# Patient Record
Sex: MALE | Race: BLACK OR AFRICAN AMERICAN | NOT HISPANIC OR LATINO
[De-identification: names, ages, dates, MRNs, and addresses within clinical notes are randomized per-mention and may not be internally consistent; named-entity substitution may affect disease eponyms.]

---

## 2018-08-06 ENCOUNTER — APPOINTMENT (OUTPATIENT)
Dept: FAMILY MEDICINE | Facility: CLINIC | Age: 38
End: 2018-08-06

## 2018-08-08 ENCOUNTER — APPOINTMENT (OUTPATIENT)
Dept: FAMILY MEDICINE | Facility: CLINIC | Age: 38
End: 2018-08-08
Payer: COMMERCIAL

## 2018-08-08 VITALS
DIASTOLIC BLOOD PRESSURE: 76 MMHG | WEIGHT: 140 LBS | HEIGHT: 69 IN | BODY MASS INDEX: 20.73 KG/M2 | HEART RATE: 93 BPM | SYSTOLIC BLOOD PRESSURE: 113 MMHG

## 2018-08-08 DIAGNOSIS — Z81.1 FAMILY HISTORY OF ALCOHOL ABUSE AND DEPENDENCE: ICD-10-CM

## 2018-08-08 DIAGNOSIS — Z11.3 ENCOUNTER FOR SCREENING FOR INFECTIONS WITH A PREDOMINANTLY SEXUAL MODE OF TRANSMISSION: ICD-10-CM

## 2018-08-08 DIAGNOSIS — Z82.49 FAMILY HISTORY OF ISCHEMIC HEART DISEASE AND OTHER DISEASES OF THE CIRCULATORY SYSTEM: ICD-10-CM

## 2018-08-08 PROCEDURE — 99203 OFFICE O/P NEW LOW 30 MIN: CPT

## 2018-08-14 PROBLEM — Z81.1 FAMILY HISTORY OF ALCOHOLISM: Status: ACTIVE | Noted: 2018-08-14

## 2018-08-14 PROBLEM — Z82.49 FAMILY HISTORY OF HYPERTENSION: Status: ACTIVE | Noted: 2018-08-14

## 2018-08-14 NOTE — PHYSICAL EXAM
[No Acute Distress] : no acute distress [Well Nourished] : well nourished [Well Developed] : well developed [Well-Appearing] : well-appearing [Normal Voice/Communication] : normal voice/communication [No JVD] : no jugular venous distention [Supple] : supple [No Lymphadenopathy] : no lymphadenopathy [No Respiratory Distress] : no respiratory distress  [Clear to Auscultation] : lungs were clear to auscultation bilaterally [No Accessory Muscle Use] : no accessory muscle use [Normal Gait] : normal gait [Speech Grossly Normal] : speech grossly normal [Memory Grossly Normal] : memory grossly normal [Normal Affect] : the affect was normal [Alert and Oriented x3] : oriented to person, place, and time [Normal Insight/Judgement] : insight and judgment were intact

## 2018-08-14 NOTE — HISTORY OF PRESENT ILLNESS
[FreeTextEntry8] : New patient presents c/o thinks he has ADHD like his 2 brothers (ages 20 and 21) who were diagnosed in the past year. They take Adderall. 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 Has trouble at one job because he is assigned to two different areas, and difficult managing both. Drinks Coffee 6 times per day, helps him concentrate. Sees Dr. Wyman, psychologist, who recommended he start a medication for ADHD.

## 2018-08-14 NOTE — ASSESSMENT
[FreeTextEntry1] : ADHD survey completed by patient, to be scanned\par start Adderall, call or come in if any adverse effects noticed\par RTO 1 month for f/u appointment re: ADHD

## 2018-08-29 ENCOUNTER — APPOINTMENT (OUTPATIENT)
Dept: FAMILY MEDICINE | Facility: CLINIC | Age: 38
End: 2018-08-29

## 2018-08-31 ENCOUNTER — APPOINTMENT (OUTPATIENT)
Dept: FAMILY MEDICINE | Facility: CLINIC | Age: 38
End: 2018-08-31
Payer: COMMERCIAL

## 2018-08-31 VITALS
HEIGHT: 69 IN | BODY MASS INDEX: 18.99 KG/M2 | DIASTOLIC BLOOD PRESSURE: 70 MMHG | HEART RATE: 85 BPM | SYSTOLIC BLOOD PRESSURE: 110 MMHG | OXYGEN SATURATION: 100 % | WEIGHT: 128.25 LBS | RESPIRATION RATE: 16 BRPM

## 2018-08-31 PROCEDURE — 99213 OFFICE O/P EST LOW 20 MIN: CPT

## 2018-08-31 NOTE — ASSESSMENT
[FreeTextEntry1] : Patient seems to be doing quite well we will refill his medication he is advised to contact Dr. Otoole in some time it in the next 2 weeks by telephone to determine what followup schedule she would like to maintain

## 2018-08-31 NOTE — HISTORY OF PRESENT ILLNESS
[FreeTextEntry1] : ADHD [de-identified] : Patient is here for a followup visit on his ADHD he was started on an around last month he's been taking this now with no issues he feels this is definitely made an improvement in the way he feels and what he can function he is following up with his psychologist who has been involved in this diagnostic and therapeutic process review of systems is unremarkable

## 2018-08-31 NOTE — PHYSICAL EXAM
[No Acute Distress] : no acute distress [Well Nourished] : well nourished [Well Developed] : well developed [Well-Appearing] : well-appearing [Normal Sclera/Conjunctiva] : normal sclera/conjunctiva [PERRL] : pupils equal round and reactive to light [Normal Outer Ear/Nose] : the outer ears and nose were normal in appearance [Normal Oropharynx] : the oropharynx was normal [No JVD] : no jugular venous distention [Supple] : supple [No Lymphadenopathy] : no lymphadenopathy [No Respiratory Distress] : no respiratory distress  [Clear to Auscultation] : lungs were clear to auscultation bilaterally [No Accessory Muscle Use] : no accessory muscle use [Normal Rate] : normal rate  [Regular Rhythm] : with a regular rhythm [Normal S1, S2] : normal S1 and S2 [No Murmur] : no murmur heard [Soft] : abdomen soft [Non Tender] : non-tender [Non-distended] : non-distended [No HSM] : no HSM [Normal Bowel Sounds] : normal bowel sounds

## 2018-09-28 ENCOUNTER — APPOINTMENT (OUTPATIENT)
Dept: FAMILY MEDICINE | Facility: CLINIC | Age: 38
End: 2018-09-28

## 2018-10-05 ENCOUNTER — APPOINTMENT (OUTPATIENT)
Dept: FAMILY MEDICINE | Facility: CLINIC | Age: 38
End: 2018-10-05
Payer: COMMERCIAL

## 2018-10-05 VITALS
RESPIRATION RATE: 17 BRPM | BODY MASS INDEX: 20.29 KG/M2 | OXYGEN SATURATION: 100 % | DIASTOLIC BLOOD PRESSURE: 80 MMHG | HEART RATE: 95 BPM | SYSTOLIC BLOOD PRESSURE: 128 MMHG | TEMPERATURE: 98.1 F | HEIGHT: 69 IN | WEIGHT: 137 LBS

## 2018-10-05 PROCEDURE — 99214 OFFICE O/P EST MOD 30 MIN: CPT

## 2018-10-10 NOTE — PHYSICAL EXAM
[No Acute Distress] : no acute distress [Well Nourished] : well nourished [Well Developed] : well developed [Well-Appearing] : well-appearing [Normal Voice/Communication] : normal voice/communication [Normal Sclera/Conjunctiva] : normal sclera/conjunctiva [No Respiratory Distress] : no respiratory distress  [Clear to Auscultation] : lungs were clear to auscultation bilaterally [No Accessory Muscle Use] : no accessory muscle use [Normal Rate] : normal rate  [Regular Rhythm] : with a regular rhythm [Normal S1, S2] : normal S1 and S2

## 2018-10-10 NOTE — ASSESSMENT
[FreeTextEntry1] : increase Adderall to one tab in morning, and 1/2 tab in afternoon when needed\par continue regular counseling \par RTO 3 months for f/u and prn

## 2018-10-10 NOTE — HISTORY OF PRESENT ILLNESS
[FreeTextEntry1] : Patient presents for f/u ADD. Taking 10 mg in morning and sometimes takes 5 mg tab in afternoon. 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 Taking the 15 mg per day helps him concentrate and get through work. Now has only one job, got promoted to . Still seeing Dr. Wyman weekly for counseling. \par \par ROS: negative except as noted above\par

## 2018-10-10 NOTE — HISTORY OF PRESENT ILLNESS
[FreeTextEntry1] : Patient presents for f/u ADD. Taking 10 mg in morning and sometimes takes 5 mg tab in afternoon. 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 Taking the 15 mg per day helps him concentrate and get through work. Now has only one job, got promoted to . Still seeing Dr. Wyman weekly for counseling. \par \par ROS: negative except as noted above\par

## 2018-10-10 NOTE — COUNSELING
[Healthy eating counseling provided] : healthy eating [Activity counseling provided] : activity [Engage in a relaxing activity] : Engage in a relaxing activity

## 2018-11-02 ENCOUNTER — RX RENEWAL (OUTPATIENT)
Age: 38
End: 2018-11-02

## 2018-11-29 ENCOUNTER — RX RENEWAL (OUTPATIENT)
Age: 38
End: 2018-11-29

## 2018-12-02 ENCOUNTER — EMERGENCY (EMERGENCY)
Facility: HOSPITAL | Age: 38
LOS: 1 days | Discharge: HOME | End: 2018-12-02
Attending: EMERGENCY MEDICINE | Admitting: EMERGENCY MEDICINE
Payer: MEDICAID

## 2018-12-02 VITALS
SYSTOLIC BLOOD PRESSURE: 124 MMHG | HEART RATE: 100 BPM | DIASTOLIC BLOOD PRESSURE: 78 MMHG | OXYGEN SATURATION: 96 % | HEIGHT: 69 IN | WEIGHT: 139.99 LBS | RESPIRATION RATE: 16 BRPM | TEMPERATURE: 97 F

## 2018-12-02 PROCEDURE — 99283 EMERGENCY DEPT VISIT LOW MDM: CPT | Mod: 25

## 2018-12-02 PROCEDURE — 99283 EMERGENCY DEPT VISIT LOW MDM: CPT

## 2018-12-02 RX ORDER — ONDANSETRON 8 MG/1
4 TABLET, FILM COATED ORAL ONCE
Qty: 0 | Refills: 0 | Status: COMPLETED | OUTPATIENT
Start: 2018-12-02 | End: 2018-12-02

## 2018-12-02 RX ORDER — ONDANSETRON 8 MG/1
1 TABLET, FILM COATED ORAL
Qty: 15 | Refills: 0
Start: 2018-12-02 | End: 2018-12-06

## 2018-12-02 RX ADMIN — ONDANSETRON 4 MILLIGRAM(S): 8 TABLET, FILM COATED ORAL at 23:23

## 2018-12-02 NOTE — ED PROVIDER NOTE - MEDICAL DECISION MAKING DETAILS
Edwige:  pt refusing IV and blood work, benign exam, will trial zofran ODT and d/c on short course of same w return precautions.

## 2018-12-02 NOTE — ED PROVIDER NOTE - OBJECTIVE STATEMENT
n/v/d x2 days, states his nephew had the same symptoms 3-4 days ago and is better now.  pt denies any abdominal pain or fever, earlier threw up 3 times but since has tolerated 10 oz of OJ.  pt declined IV and IV hydration, just requesting medication for vomiting.

## 2018-12-02 NOTE — ED ADULT NURSE NOTE - OBJECTIVE STATEMENT
Abdominal cramps, vomiting and diarrhea X's 2 days. Symptoms began after babysitting nephew with the same symptoms.

## 2018-12-28 ENCOUNTER — RX RENEWAL (OUTPATIENT)
Age: 38
End: 2018-12-28

## 2019-01-02 ENCOUNTER — APPOINTMENT (OUTPATIENT)
Dept: FAMILY MEDICINE | Facility: CLINIC | Age: 39
End: 2019-01-02

## 2019-01-25 ENCOUNTER — APPOINTMENT (OUTPATIENT)
Dept: FAMILY MEDICINE | Facility: CLINIC | Age: 39
End: 2019-01-25
Payer: MEDICAID

## 2019-01-25 VITALS
HEIGHT: 69 IN | BODY MASS INDEX: 19.7 KG/M2 | RESPIRATION RATE: 16 BRPM | OXYGEN SATURATION: 100 % | DIASTOLIC BLOOD PRESSURE: 64 MMHG | HEART RATE: 103 BPM | SYSTOLIC BLOOD PRESSURE: 110 MMHG | WEIGHT: 133 LBS

## 2019-01-25 PROCEDURE — 99214 OFFICE O/P EST MOD 30 MIN: CPT

## 2019-01-25 NOTE — HISTORY OF PRESENT ILLNESS
[FreeTextEntry1] : Patient presents for f/u ADD and medication renewal. Still has not had blood work drawn.  Feels well. 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 \par \par on ros: patient states he has symptoms of Raynaud's phenomenon x 4 years\par \par MGM: + RA arthritis\par \par

## 2019-01-25 NOTE — PHYSICAL EXAM
[No Acute Distress] : no acute distress [Well Nourished] : well nourished [Well Developed] : well developed [Well-Appearing] : well-appearing [Normal Voice/Communication] : normal voice/communication [Normal Sclera/Conjunctiva] : normal sclera/conjunctiva [Normal Outer Ear/Nose] : the outer ears and nose were normal in appearance [No Respiratory Distress] : no respiratory distress  [Clear to Auscultation] : lungs were clear to auscultation bilaterally [No Accessory Muscle Use] : no accessory muscle use [Normal Rate] : normal rate  [Regular Rhythm] : with a regular rhythm [Normal S1, S2] : normal S1 and S2

## 2019-02-25 ENCOUNTER — LABORATORY RESULT (OUTPATIENT)
Age: 39
End: 2019-02-25

## 2019-04-17 ENCOUNTER — MEDICATION RENEWAL (OUTPATIENT)
Age: 39
End: 2019-04-17

## 2019-04-23 ENCOUNTER — MEDICATION RENEWAL (OUTPATIENT)
Age: 39
End: 2019-04-23

## 2019-05-20 ENCOUNTER — APPOINTMENT (OUTPATIENT)
Dept: FAMILY MEDICINE | Facility: CLINIC | Age: 39
End: 2019-05-20

## 2019-05-29 ENCOUNTER — APPOINTMENT (OUTPATIENT)
Dept: FAMILY MEDICINE | Facility: CLINIC | Age: 39
End: 2019-05-29

## 2019-06-19 ENCOUNTER — APPOINTMENT (OUTPATIENT)
Dept: FAMILY MEDICINE | Facility: CLINIC | Age: 39
End: 2019-06-19
Payer: MEDICAID

## 2019-06-19 VITALS
RESPIRATION RATE: 14 BRPM | SYSTOLIC BLOOD PRESSURE: 92 MMHG | HEART RATE: 93 BPM | DIASTOLIC BLOOD PRESSURE: 70 MMHG | OXYGEN SATURATION: 98 % | BODY MASS INDEX: 20.29 KG/M2 | HEIGHT: 69 IN | WEIGHT: 137 LBS

## 2019-06-19 PROCEDURE — 99213 OFFICE O/P EST LOW 20 MIN: CPT

## 2019-06-19 NOTE — PHYSICAL EXAM
[No Acute Distress] : no acute distress [Well Nourished] : well nourished [Well Developed] : well developed [Well-Appearing] : well-appearing [Normal Outer Ear/Nose] : the outer ears and nose were normal in appearance [Normal Oropharynx] : the oropharynx was normal [No JVD] : no jugular venous distention [Supple] : supple [No Lymphadenopathy] : no lymphadenopathy [Thyroid Normal, No Nodules] : the thyroid was normal and there were no nodules present [No Respiratory Distress] : no respiratory distress  [Clear to Auscultation] : lungs were clear to auscultation bilaterally [Normal Rate] : normal rate  [Regular Rhythm] : with a regular rhythm [No Murmur] : no murmur heard [No Edema] : there was no peripheral edema [Non-distended] : non-distended [Soft] : abdomen soft [No HSM] : no HSM [Normal Supraclavicular Nodes] : no supraclavicular lymphadenopathy [Normal Posterior Cervical Nodes] : no posterior cervical lymphadenopathy [Normal Anterior Cervical Nodes] : no anterior cervical lymphadenopathy [No CVA Tenderness] : no CVA  tenderness [No Spinal Tenderness] : no spinal tenderness [Coordination Grossly Intact] : coordination grossly intact [Normal Gait] : normal gait [No Focal Deficits] : no focal deficits [Normal Mood] : the mood was normal [Normal Insight/Judgement] : insight and judgment were intact

## 2019-06-19 NOTE — ASSESSMENT
[FreeTextEntry1] : The patient seems to be doing well we will continue him on his current medications refills will be ordered he is been told him in for a followup in approximately 6 months or as needed

## 2019-06-19 NOTE — HISTORY OF PRESENT ILLNESS
[FreeTextEntry1] : ADHD [de-identified] : Patient has a long-standing history of ADHD for which he takes Adderall he feels well this seems to be helping him he is on no other regular medications review of systems is unremarkable no lightheadedness or headaches no chest pain or palpitations no GI or  issues

## 2019-10-17 ENCOUNTER — APPOINTMENT (OUTPATIENT)
Dept: FAMILY MEDICINE | Facility: CLINIC | Age: 39
End: 2019-10-17
Payer: MEDICAID

## 2019-10-17 VITALS
OXYGEN SATURATION: 98 % | RESPIRATION RATE: 14 BRPM | SYSTOLIC BLOOD PRESSURE: 118 MMHG | WEIGHT: 135.5 LBS | HEART RATE: 97 BPM | DIASTOLIC BLOOD PRESSURE: 80 MMHG | BODY MASS INDEX: 20.07 KG/M2 | HEIGHT: 69 IN

## 2019-10-17 PROCEDURE — 99213 OFFICE O/P EST LOW 20 MIN: CPT

## 2019-10-17 NOTE — HISTORY OF PRESENT ILLNESS
[FreeTextEntry1] : here for adhd refill [de-identified] : overall feesl well on medication\par tries to take drug holiday

## 2019-12-10 ENCOUNTER — APPOINTMENT (OUTPATIENT)
Dept: FAMILY MEDICINE | Facility: CLINIC | Age: 39
End: 2019-12-10
Payer: MEDICAID

## 2019-12-10 VITALS
WEIGHT: 135 LBS | OXYGEN SATURATION: 98 % | HEIGHT: 69 IN | SYSTOLIC BLOOD PRESSURE: 104 MMHG | DIASTOLIC BLOOD PRESSURE: 78 MMHG | RESPIRATION RATE: 14 BRPM | BODY MASS INDEX: 19.99 KG/M2 | HEART RATE: 76 BPM

## 2019-12-10 PROCEDURE — 99214 OFFICE O/P EST MOD 30 MIN: CPT

## 2019-12-11 NOTE — PLAN
[FreeTextEntry1] : Xray chest to assess for rib fractures. Encouraged pain relief. Patient does not tolerate NSAIDS. Encouraged Tylenol use and lidocaine patches.\par \par For completeness sake due to abdominal tenderness will get imaging of his stomach and labs. Patient aware if symptoms worsen to report to ed\par \par ADHD stable on current dose of Adderall. No SA noted. Will renew current dose while covering for Dr. Cummins

## 2019-12-11 NOTE — HISTORY OF PRESENT ILLNESS
[FreeTextEntry8] : Slip and fell 5 days ago and  landed on left side. Denies head strike or altered loc. Notes an 8 out of 10 sharp left rib pain that is worse with movement. Worse when he takes a deep breath or when he goes to the bathroom.  NOtes normal bm. NO abdominal pain at rest. \par \par  Also requesting renewal for Adderall  for ADHD. Reports tolerating medication well. Denies tachycardia, chest pain, insomnia or weight loss. ISTOP completed. patient not due until 12/12. Patient aware.\par

## 2019-12-11 NOTE — PHYSICAL EXAM
[No Acute Distress] : no acute distress [Well Developed] : well developed [Well Nourished] : well nourished [Normal Sclera/Conjunctiva] : normal sclera/conjunctiva [PERRL] : pupils equal round and reactive to light [Well-Appearing] : well-appearing [EOMI] : extraocular movements intact [Normal Outer Ear/Nose] : the outer ears and nose were normal in appearance [Normal Oropharynx] : the oropharynx was normal [No Lymphadenopathy] : no lymphadenopathy [No JVD] : no jugular venous distention [Supple] : supple [Thyroid Normal, No Nodules] : the thyroid was normal and there were no nodules present [No Respiratory Distress] : no respiratory distress  [No Accessory Muscle Use] : no accessory muscle use [Clear to Auscultation] : lungs were clear to auscultation bilaterally [Normal Rate] : normal rate  [Normal S1, S2] : normal S1 and S2 [Regular Rhythm] : with a regular rhythm [No Murmur] : no murmur heard [No Carotid Bruits] : no carotid bruits [No Abdominal Bruit] : a ~M bruit was not heard ~T in the abdomen [No Varicosities] : no varicosities [Pedal Pulses Present] : the pedal pulses are present [No Palpable Aorta] : no palpable aorta [No Edema] : there was no peripheral edema [No Extremity Clubbing/Cyanosis] : no extremity clubbing/cyanosis [Soft] : abdomen soft [Non-distended] : non-distended [No HSM] : no HSM [No Masses] : no abdominal mass palpated [Normal Posterior Cervical Nodes] : no posterior cervical lymphadenopathy [Normal Bowel Sounds] : normal bowel sounds [Normal Anterior Cervical Nodes] : no anterior cervical lymphadenopathy [No CVA Tenderness] : no CVA  tenderness [No Joint Swelling] : no joint swelling [No Spinal Tenderness] : no spinal tenderness [Grossly Normal Strength/Tone] : grossly normal strength/tone [Coordination Grossly Intact] : coordination grossly intact [No Focal Deficits] : no focal deficits [No Rash] : no rash [Normal Gait] : normal gait [Deep Tendon Reflexes (DTR)] : deep tendon reflexes were 2+ and symmetric [Normal Affect] : the affect was normal [Normal Insight/Judgement] : insight and judgment were intact [de-identified] : Grossly clear breath sounds bilaterally [de-identified] : Mild lUQ tenderness [de-identified] : Left rib tenderness.

## 2019-12-12 ENCOUNTER — OTHER (OUTPATIENT)
Age: 39
End: 2019-12-12

## 2020-02-03 ENCOUNTER — APPOINTMENT (OUTPATIENT)
Dept: FAMILY MEDICINE | Facility: CLINIC | Age: 40
End: 2020-02-03
Payer: MEDICAID

## 2020-02-03 PROCEDURE — 99214 OFFICE O/P EST MOD 30 MIN: CPT

## 2020-02-06 NOTE — HISTORY OF PRESENT ILLNESS
[FreeTextEntry1] : Patient presents c/o lower back pain also f/u ADD. His father passed away this past month, grieving. Still working at [employer]'s [employer], enjoys his work. Has h/o lower back pain in the past for which he was seen at [facility] station, and prescribed pain killers and muscle relaxants. States lower back is bothering him again. Stable on current meds for ADD. Also having pain in left shoulder \par \par ROS: negative except as noted above\par

## 2020-02-06 NOTE — ASSESSMENT
[FreeTextEntry1] : xrays, PT, lidocaine patch has worked for him in the past\par Return to office if you feel worse or do not feel better\par

## 2020-02-06 NOTE — PHYSICAL EXAM
[No Acute Distress] : no acute distress [Well Nourished] : well nourished [Well Developed] : well developed [Normal Voice/Communication] : normal voice/communication [Well-Appearing] : well-appearing [Normal Sclera/Conjunctiva] : normal sclera/conjunctiva [PERRL] : pupils equal round and reactive to light [Normal Oropharynx] : the oropharynx was normal [Normal Outer Ear/Nose] : the outer ears and nose were normal in appearance [No Accessory Muscle Use] : no accessory muscle use [No Respiratory Distress] : no respiratory distress  [Normal Rate] : normal rate  [Clear to Auscultation] : lungs were clear to auscultation bilaterally [Regular Rhythm] : with a regular rhythm [Normal S1, S2] : normal S1 and S2 [Coordination Grossly Intact] : coordination grossly intact [No Focal Deficits] : no focal deficits [Deep Tendon Reflexes (DTR)] : deep tendon reflexes were 2+ and symmetric [Normal Gait] : normal gait [Speech Grossly Normal] : speech grossly normal [Memory Grossly Normal] : memory grossly normal [Normal Affect] : the affect was normal [Alert and Oriented x3] : oriented to person, place, and time [Normal Insight/Judgement] : insight and judgment were intact [de-identified] : straight leg raise negative b/l. Decreased ROM left shoulder as compared to right.  [de-identified] : LE reflexes normal

## 2020-03-13 ENCOUNTER — APPOINTMENT (OUTPATIENT)
Dept: FAMILY MEDICINE | Facility: CLINIC | Age: 40
End: 2020-03-13

## 2020-03-29 ENCOUNTER — EMERGENCY (EMERGENCY)
Facility: HOSPITAL | Age: 40
LOS: 1 days | Discharge: HOME | End: 2020-03-29
Attending: EMERGENCY MEDICINE | Admitting: EMERGENCY MEDICINE
Payer: SELF-PAY

## 2020-03-29 VITALS
OXYGEN SATURATION: 98 % | WEIGHT: 149.91 LBS | HEART RATE: 103 BPM | RESPIRATION RATE: 17 BRPM | HEIGHT: 68 IN | DIASTOLIC BLOOD PRESSURE: 86 MMHG | SYSTOLIC BLOOD PRESSURE: 125 MMHG | TEMPERATURE: 98 F

## 2020-03-29 DIAGNOSIS — M79.643 PAIN IN UNSPECIFIED HAND: ICD-10-CM

## 2020-03-29 PROCEDURE — 99283 EMERGENCY DEPT VISIT LOW MDM: CPT | Mod: 25

## 2020-03-29 PROCEDURE — 73130 X-RAY EXAM OF HAND: CPT

## 2020-03-29 PROCEDURE — 73130 X-RAY EXAM OF HAND: CPT | Mod: 26,RT

## 2020-03-29 PROCEDURE — 99284 EMERGENCY DEPT VISIT MOD MDM: CPT | Mod: 25

## 2020-03-29 PROCEDURE — 29125 APPL SHORT ARM SPLINT STATIC: CPT

## 2020-03-29 PROCEDURE — 29125 APPL SHORT ARM SPLINT STATIC: CPT | Mod: RT

## 2020-03-29 RX ORDER — ACETAMINOPHEN 500 MG
650 TABLET ORAL ONCE
Refills: 0 | Status: COMPLETED | OUTPATIENT
Start: 2020-03-29 | End: 2020-03-29

## 2020-03-29 RX ADMIN — Medication 650 MILLIGRAM(S): at 13:55

## 2020-03-29 NOTE — ED PROVIDER NOTE - LOCATION
hand tenderness to 4th and 5th metacarpals, positive radial pulse, less than 2 sec cap refill, no snuffbox tenderness/hand

## 2020-03-29 NOTE — ED PROVIDER NOTE - PATIENT PORTAL LINK FT
You can access the FollowMyHealth Patient Portal offered by [facility] by registering at the following website: [email or web address]. By joining [facility]’s FollowMyHealth portal, you will also be able to view your health information using other applications (apps) compatible with our system.

## 2020-03-29 NOTE — ED PROVIDER NOTE - OBJECTIVE STATEMENT
39 yr old male with no pmhx presents with right hand pain s/p punching a wall, after finding out his girlfriend was in bed with his sister and her boyfriend. Pt reports taking 2 shots of liquor for pain prior to arrival. No other symptoms. Denies hitting head. Right hand dominant

## 2020-03-29 NOTE — ED PROVIDER NOTE - CLINICAL SUMMARY MEDICAL DECISION MAKING FREE TEXT BOX
Dr. Nixon: 39M right hand dominant, p/w pain at base of 5th MCP after punching a wall. No other injuries. On exam pt is well appearing, nad, +ttp at base of 5th MCP over dorsum. Will get xray r/o Boxer's fx, splint.

## 2020-03-29 NOTE — ED PROVIDER NOTE - CARE PROVIDER_API CALL
Isaac Michael)  Plastic Surgery  [address] [place] [address]  Phone: [phone]  Fax: [phone]  Follow Up Time:

## 2020-03-29 NOTE — ED PROCEDURE NOTE - ATTENDING CONTRIBUTION TO CARE
Dr. Nixon: I performed a face to face bedside interview with patient regarding history of present illness, review of symptoms and past medical history. I completed an independent physical exam.  I have discussed patient's plan of care with PA.   I agree with note as stated above, having amended the EMR as needed to reflect my findings.   This includes HISTORY OF PRESENT ILLNESS, HIV, PAST MEDICAL/SURGICAL/FAMILY/SOCIAL HISTORY, ALLERGIES AND HOME MEDICATIONS, REVIEW OF SYSTEMS, PHYSICAL EXAM, and any PROGRESS NOTES during the time I functioned as the attending physician for this patient.

## 2020-03-29 NOTE — ED PROVIDER NOTE - NSFOLLOWUPINSTRUCTIONS_ED_ALL_ED_FT
Rest, ice. elevate   take motrin 600mg every 6 hours as needed for pain.   Keep splint clean and dry and keep in place   Follow up with hand surgery.   Return to the ED if any worsening or persistent symptoms.     Boxer Fracture    WHAT YOU NEED TO KNOW:    A boxer fracture is a break of a bone in your hand. It usually happens in the bone that connects your wrist to your little finger or ring finger.     DISCHARGE INSTRUCTIONS:    Return to the emergency department if:     You cannot bend or extend your finger.      You have severe pain.      You have numbness or tingling in your finger.    Call your doctor if:     You have a fever.      Your open wound is red, swollen, or draining pus.      You have trouble moving your finger.      You have questions or concerns about your condition or care.    Medicines: You may need any of the following:     NSAIDs, such as ibuprofen, help decrease swelling, pain, and fever. This medicine is available with or without a doctor's order. NSAIDs can cause stomach bleeding or kidney problems in certain people. If you take blood thinner medicine, always ask your healthcare provider if NSAIDs are safe for you. Always read the medicine label and follow directions.      Prescription pain medicine may be given. Ask your healthcare provider how to take this medicine safely. Some prescription pain medicines contain acetaminophen. Do not take other medicines that contain acetaminophen without talking to your healthcare provider. Too much acetaminophen may cause liver damage. Prescription pain medicine may cause constipation. Ask your healthcare provider how to prevent or treat constipation.       Take your medicine as directed. Contact your healthcare provider if you think your medicine is not helping or if you have side effects. Tell him or her if you are allergic to any medicine. Keep a list of the medicines, vitamins, and herbs you take. Include the amounts, and when and why you take them. Bring the list or the pill bottles to follow-up visits. Carry your medicine list with you in case of an emergency.    Self-care:     Apply ice on your injury for 15 to 20 minutes every hour for 24 hours or as directed. Use an ice pack, or put crushed ice in a plastic bag. Cover it with a towel. Ice helps prevent tissue damage and decreases swelling and pain.      Elevate your hand above the level of your heart as often as you can. This will help decrease swelling and pain. Prop your hand on pillows or blankets to keep it elevated comfortably.       Go to physical therapy if directed. A physical therapist teaches you exercises to help improve movement and strength, and to decrease pain.    Follow up with your doctor or orthopedist as directed: You may need to return for more x-rays or another cast. Write down your questions so you remember to ask them during your visits. Rest, ice. elevate   take tylenol 1000mg every 8 hours as needed for pain.   Keep splint clean and dry and keep in place   Follow up with hand surgery.   Return to the ED if any worsening or persistent symptoms.     Boxer Fracture    WHAT YOU NEED TO KNOW:    A boxer fracture is a break of a bone in your hand. It usually happens in the bone that connects your wrist to your little finger or ring finger.     DISCHARGE INSTRUCTIONS:    Return to the emergency department if:     You cannot bend or extend your finger.      You have severe pain.      You have numbness or tingling in your finger.    Call your doctor if:     You have a fever.      Your open wound is red, swollen, or draining pus.      You have trouble moving your finger.      You have questions or concerns about your condition or care.    Medicines: You may need any of the following:     NSAIDs, such as ibuprofen, help decrease swelling, pain, and fever. This medicine is available with or without a doctor's order. NSAIDs can cause stomach bleeding or kidney problems in certain people. If you take blood thinner medicine, always ask your healthcare provider if NSAIDs are safe for you. Always read the medicine label and follow directions.      Prescription pain medicine may be given. Ask your healthcare provider how to take this medicine safely. Some prescription pain medicines contain acetaminophen. Do not take other medicines that contain acetaminophen without talking to your healthcare provider. Too much acetaminophen may cause liver damage. Prescription pain medicine may cause constipation. Ask your healthcare provider how to prevent or treat constipation.       Take your medicine as directed. Contact your healthcare provider if you think your medicine is not helping or if you have side effects. Tell him or her if you are allergic to any medicine. Keep a list of the medicines, vitamins, and herbs you take. Include the amounts, and when and why you take them. Bring the list or the pill bottles to follow-up visits. Carry your medicine list with you in case of an emergency.    Self-care:     Apply ice on your injury for 15 to 20 minutes every hour for 24 hours or as directed. Use an ice pack, or put crushed ice in a plastic bag. Cover it with a towel. Ice helps prevent tissue damage and decreases swelling and pain.      Elevate your hand above the level of your heart as often as you can. This will help decrease swelling and pain. Prop your hand on pillows or blankets to keep it elevated comfortably.       Go to physical therapy if directed. A physical therapist teaches you exercises to help improve movement and strength, and to decrease pain.    Follow up with your doctor or orthopedist as directed: You may need to return for more x-rays or another cast. Write down your questions so you remember to ask them during your visits.

## 2020-03-29 NOTE — ED PROVIDER NOTE - ATTENDING CONTRIBUTION TO CARE
Dr. Nixon: I performed a face to face bedside interview with patient regarding history of present illness, review of symptoms and past medical history. I completed an independent physical exam.  I have discussed patient's plan of care with PA.   I agree with note as stated above, having amended the EMR as needed to reflect my findings.   This includes HISTORY OF PRESENT ILLNESS, HIV, PAST MEDICAL/SURGICAL/FAMILY/SOCIAL HISTORY, ALLERGIES AND HOME MEDICATIONS, REVIEW OF SYSTEMS, PHYSICAL EXAM, and any PROGRESS NOTES during the time I functioned as the attending physician for this patient.    see mdm

## 2020-03-29 NOTE — ED ADULT TRIAGE NOTE - CHIEF COMPLAINT QUOTE
Pt punched wall  several times, c/o pain 10/10 right hand.  Pt states he took 2 hots of liquor for pain relief.

## 2020-03-30 ENCOUNTER — APPOINTMENT (OUTPATIENT)
Dept: FAMILY MEDICINE | Facility: CLINIC | Age: 40
End: 2020-03-30

## 2020-03-30 PROBLEM — Z78.9 OTHER SPECIFIED HEALTH STATUS: Chronic | Status: ACTIVE | Noted: 2020-03-29

## 2020-06-15 ENCOUNTER — APPOINTMENT (OUTPATIENT)
Dept: FAMILY MEDICINE | Facility: CLINIC | Age: 40
End: 2020-06-15
Payer: MEDICAID

## 2020-06-15 VITALS
HEIGHT: 69 IN | WEIGHT: 135.44 LBS | TEMPERATURE: 98.2 F | DIASTOLIC BLOOD PRESSURE: 60 MMHG | HEART RATE: 96 BPM | OXYGEN SATURATION: 99 % | BODY MASS INDEX: 20.06 KG/M2 | SYSTOLIC BLOOD PRESSURE: 110 MMHG | RESPIRATION RATE: 16 BRPM

## 2020-06-15 DIAGNOSIS — E55.9 VITAMIN D DEFICIENCY, UNSPECIFIED: ICD-10-CM

## 2020-06-15 PROCEDURE — 99213 OFFICE O/P EST LOW 20 MIN: CPT

## 2020-06-17 PROBLEM — E55.9 VITAMIN D DEFICIENCY: Status: ACTIVE | Noted: 2019-02-26

## 2020-06-17 NOTE — HISTORY OF PRESENT ILLNESS
[FreeTextEntry1] : here for refills [de-identified] : overall feels well\par works at [employer]\par needs refills for add medicine\par stable, with no symptoms of racing pulse , palpitations

## 2020-07-14 ENCOUNTER — RX RENEWAL (OUTPATIENT)
Age: 40
End: 2020-07-14

## 2020-08-02 ENCOUNTER — EMERGENCY (EMERGENCY)
Facility: HOSPITAL | Age: 40
LOS: 1 days | Discharge: HOME | End: 2020-08-02
Attending: STUDENT IN AN ORGANIZED HEALTH CARE EDUCATION/TRAINING PROGRAM | Admitting: STUDENT IN AN ORGANIZED HEALTH CARE EDUCATION/TRAINING PROGRAM
Payer: SELF-PAY

## 2020-08-02 VITALS
DIASTOLIC BLOOD PRESSURE: 69 MMHG | RESPIRATION RATE: 17 BRPM | TEMPERATURE: 98 F | OXYGEN SATURATION: 97 % | HEIGHT: 68 IN | SYSTOLIC BLOOD PRESSURE: 103 MMHG | HEART RATE: 96 BPM | WEIGHT: 149.91 LBS

## 2020-08-02 VITALS
RESPIRATION RATE: 20 BRPM | HEART RATE: 95 BPM | SYSTOLIC BLOOD PRESSURE: 106 MMHG | OXYGEN SATURATION: 100 % | DIASTOLIC BLOOD PRESSURE: 80 MMHG

## 2020-08-02 DIAGNOSIS — F17.200 NICOTINE DEPENDENCE, UNSPECIFIED, UNCOMPLICATED: ICD-10-CM

## 2020-08-02 DIAGNOSIS — R07.9 CHEST PAIN, UNSPECIFIED: ICD-10-CM

## 2020-08-02 LAB
ALBUMIN SERPL ELPH-MCNC: 3.7 G/DL — SIGNIFICANT CHANGE UP (ref 3.3–5)
ALP SERPL-CCNC: 57 U/L — SIGNIFICANT CHANGE UP (ref 40–120)
ALT FLD-CCNC: 70 U/L — HIGH (ref 10–45)
ANION GAP SERPL CALC-SCNC: 9 MMOL/L — SIGNIFICANT CHANGE UP (ref 5–17)
AST SERPL-CCNC: 134 U/L — HIGH (ref 10–40)
BASOPHILS # BLD AUTO: 0.04 K/UL — SIGNIFICANT CHANGE UP (ref 0–0.2)
BASOPHILS NFR BLD AUTO: 0.4 % — SIGNIFICANT CHANGE UP (ref 0–2)
BILIRUB SERPL-MCNC: 0.9 MG/DL — SIGNIFICANT CHANGE UP (ref 0.2–1.2)
BUN SERPL-MCNC: 14 MG/DL — SIGNIFICANT CHANGE UP (ref 7–23)
CALCIUM SERPL-MCNC: 9.1 MG/DL — SIGNIFICANT CHANGE UP (ref 8.4–10.5)
CHLORIDE SERPL-SCNC: 102 MMOL/L — SIGNIFICANT CHANGE UP (ref 96–108)
CO2 SERPL-SCNC: 27 MMOL/L — SIGNIFICANT CHANGE UP (ref 22–31)
CREAT SERPL-MCNC: 1.03 MG/DL — SIGNIFICANT CHANGE UP (ref 0.5–1.3)
D DIMER BLD IA.RAPID-MCNC: <150 NG/ML DDU — SIGNIFICANT CHANGE UP
EOSINOPHIL # BLD AUTO: 0.07 K/UL — SIGNIFICANT CHANGE UP (ref 0–0.5)
EOSINOPHIL NFR BLD AUTO: 0.7 % — SIGNIFICANT CHANGE UP (ref 0–6)
GLUCOSE SERPL-MCNC: 90 MG/DL — SIGNIFICANT CHANGE UP (ref 70–99)
HCT VFR BLD CALC: 39.2 % — SIGNIFICANT CHANGE UP (ref 39–50)
HGB BLD-MCNC: 12.8 G/DL — LOW (ref 13–17)
IMM GRANULOCYTES NFR BLD AUTO: 0.7 % — SIGNIFICANT CHANGE UP (ref 0–1.5)
LYMPHOCYTES # BLD AUTO: 0.65 K/UL — LOW (ref 1–3.3)
LYMPHOCYTES # BLD AUTO: 6.7 % — LOW (ref 13–44)
MCHC RBC-ENTMCNC: 31 PG — SIGNIFICANT CHANGE UP (ref 27–34)
MCHC RBC-ENTMCNC: 32.7 GM/DL — SIGNIFICANT CHANGE UP (ref 32–36)
MCV RBC AUTO: 94.9 FL — SIGNIFICANT CHANGE UP (ref 80–100)
MONOCYTES # BLD AUTO: 0.24 K/UL — SIGNIFICANT CHANGE UP (ref 0–0.9)
MONOCYTES NFR BLD AUTO: 2.5 % — SIGNIFICANT CHANGE UP (ref 2–14)
NEUTROPHILS # BLD AUTO: 8.56 K/UL — HIGH (ref 1.8–7.4)
NEUTROPHILS NFR BLD AUTO: 89 % — HIGH (ref 43–77)
NRBC # BLD: 0 /100 WBCS — SIGNIFICANT CHANGE UP (ref 0–0)
PLATELET # BLD AUTO: 198 K/UL — SIGNIFICANT CHANGE UP (ref 150–400)
POTASSIUM SERPL-MCNC: 3.8 MMOL/L — SIGNIFICANT CHANGE UP (ref 3.5–5.3)
POTASSIUM SERPL-SCNC: 3.8 MMOL/L — SIGNIFICANT CHANGE UP (ref 3.5–5.3)
PROT SERPL-MCNC: 6.8 G/DL — SIGNIFICANT CHANGE UP (ref 6–8.3)
RBC # BLD: 4.13 M/UL — LOW (ref 4.2–5.8)
RBC # FLD: 12 % — SIGNIFICANT CHANGE UP (ref 10.3–14.5)
SODIUM SERPL-SCNC: 138 MMOL/L — SIGNIFICANT CHANGE UP (ref 135–145)
TROPONIN I SERPL-MCNC: <.017 NG/ML — LOW (ref 0.02–0.06)
WBC # BLD: 9.63 K/UL — SIGNIFICANT CHANGE UP (ref 3.8–10.5)
WBC # FLD AUTO: 9.63 K/UL — SIGNIFICANT CHANGE UP (ref 3.8–10.5)

## 2020-08-02 PROCEDURE — 93005 ELECTROCARDIOGRAM TRACING: CPT

## 2020-08-02 PROCEDURE — 36000 PLACE NEEDLE IN VEIN: CPT

## 2020-08-02 PROCEDURE — 36415 COLL VENOUS BLD VENIPUNCTURE: CPT

## 2020-08-02 PROCEDURE — 86769 SARS-COV-2 COVID-19 ANTIBODY: CPT

## 2020-08-02 PROCEDURE — 71045 X-RAY EXAM CHEST 1 VIEW: CPT

## 2020-08-02 PROCEDURE — 93010 ELECTROCARDIOGRAM REPORT: CPT

## 2020-08-02 PROCEDURE — 99284 EMERGENCY DEPT VISIT MOD MDM: CPT | Mod: 25

## 2020-08-02 PROCEDURE — 71045 X-RAY EXAM CHEST 1 VIEW: CPT | Mod: 26

## 2020-08-02 PROCEDURE — 99285 EMERGENCY DEPT VISIT HI MDM: CPT

## 2020-08-02 PROCEDURE — 80053 COMPREHEN METABOLIC PANEL: CPT

## 2020-08-02 PROCEDURE — 85027 COMPLETE CBC AUTOMATED: CPT

## 2020-08-02 PROCEDURE — 84484 ASSAY OF TROPONIN QUANT: CPT

## 2020-08-02 PROCEDURE — 85379 FIBRIN DEGRADATION QUANT: CPT

## 2020-08-02 NOTE — H&P ADULT - ASSESSMENT
39 y o m with c/o chest pain, admit to cdu for r/o ACS    CAPRINI SCORE [CLOT]    AGE RELATED RISK FACTORS                                                       MOBILITY RELATED FACTORS  [ ] Age 41-60 years                                            (1 Point)                  [ ] Bed rest                                                        (1 Point)  [ ] Age: 61-74 years                                           (2 Points)                 [ ] Plaster cast                                                   (2 Points)  [ ] Age= 75 years                                              (3 Points)                 [ ] Bed bound for more than 72 hours                 (2 Points)    DISEASE RELATED RISK FACTORS                                               GENDER SPECIFIC FACTORS  [ ] Edema in the lower extremities                       (1 Point)                  [ ] Pregnancy                                                     (1 Point)  [ ] Varicose veins                                               (1 Point)                  [ ] Post-partum < 6 weeks                                   (1 Point)             [ ] BMI > 25 Kg/m2                                            (1 Point)                  [ ] Hormonal therapy  or oral contraception          (1 Point)                 [ ] Sepsis (in the previous month)                        (1 Point)                  [ ] History of pregnancy complications                 (1 point)  [ ] Pneumonia or serious lung disease                                               [ ] Unexplained or recurrent                     (1 Point)           (in the previous month)                               (1 Point)  [ ] Abnormal pulmonary function test                     (1 Point)                 SURGERY RELATED RISK FACTORS  [ ] Acute myocardial infarction                              (1 Point)                 [ ]  Section                                             (1 Point)  [ ] Congestive heart failure (in the previous month)  (1 Point)               [ ] Minor surgery                                                  (1 Point)   [ ] Inflammatory bowel disease                             (1 Point)                 [ ] Arthroscopic surgery                                        (2 Points)  [ ] Central venous access                                      (2 Points)                [ ] General surgery lasting more than 45 minutes   (2 Points)       [ ] Stroke (in the previous month)                          (5 Points)               [ ] Elective arthroplasty                                         (5 Points)                                                                                                                                               HEMATOLOGY RELATED FACTORS                                                 TRAUMA RELATED RISK FACTORS  [ ] Prior episodes of VTE                                     (3 Points)                 [ ] Fracture of the hip, pelvis, or leg                       (5 Points)  [ ] Positive family history for VTE                         (3 Points)                 [ ] Acute spinal cord injury (in the previous month)  (5 Points)  [ ] Prothrombin 64590 A                                     (3 Points)                 [ ] Paralysis  (less than 1 month)                             (5 Points)  [ ] Factor V Leiden                                             (3 Points)                  [ ] Multiple Trauma within 1 month                        (5 Points)  [ ] Lupus anticoagulants                                     (3 Points)                                                           [ ] Anticardiolipin antibodies                               (3 Points)                                                       [ ] High homocysteine in the blood                      (3 Points)                                             [ ] Other congenital or acquired thrombophilia      (3 Points)                                                [ ] Heparin induced thrombocytopenia                  (3 Points)                                          Total Score [    0      ]

## 2020-08-02 NOTE — ED ADULT NURSE NOTE - OBJECTIVE STATEMENT
Pt came in from home with c/o left sided chest discomfort that began last night lasting 5 minutes, non-radiating, at rest NOT associated with shortness of breath. Took 2 ASA when discomfort began yesterday. Experienced similar episode today again at rest, lasting a few minutes took 2 more ASA. Pain resolved. Denies fever, chills, cough, shortness of breath, nausea, vomiting, weakness, numbness, tingling. Denies recent travel, recent illness. Pt with h/o ADHD (prescribed Adderall) and (+) daily smoker. Pt denies any n/v/d, blurred vision, headache, dizziness, fever, chills or any other complaint at this time.

## 2020-08-02 NOTE — ED PROVIDER NOTE - NSFOLLOWUPINSTRUCTIONS_ED_ALL_ED_FT
Angina    WHAT YOU NEED TO KNOW:    Angina is pain, pressure, or tightness that is usually felt in your chest. Chest pain may come on when you are stressed or do physical activities, such as walking or exercising. Angina is caused by decreased blood flow and oxygen to your heart. These are often caused by atherosclerosis (hardening of the arteries). If left untreated, angina may get worse, increase your risk for a heart attack, or become life-threatening.    DISCHARGE INSTRUCTIONS:    Call 911 if:     You have any of the following signs of a heart attack:   Squeezing, pressure, or pain in your chest      You may also have any of the following:   Discomfort or pain in your back, neck, jaw, stomach, or arm      Shortness of breath      Nausea or vomiting      Lightheadedness or a sudden cold sweat      You have chest pain that does not go away after you take medicine as directed.      You lose feeling in your face, arms, or legs, or you suddenly feel weak.     Seek care immediately if:     Your angina is happening more frequently, lasting longer, or causing worse pain.        Contact your healthcare provider if:     You are dizzy or nauseated after you take your medicine.      You have shortness of breath at rest.      You have new or worse swelling in your feet or ankles.      You have questions or concerns about your condition or care.    Medicines: You may need any of the following:     Antiplatelets, such as aspirin, help prevent blood clots. Take your antiplatelet medicine exactly as directed. These medicines make it more likely for you to bleed or bruise. If you are told to take aspirin, do not take acetaminophen or ibuprofen instead.       Blood thinners help prevent blood clots. Clots can cause strokes, heart attacks, and death. The following are general safety guidelines to follow while you are taking a blood thinner:  Watch for bleeding and bruising while you take blood thinners. Watch for bleeding from your gums or nose. Watch for blood in your urine and bowel movements. Use a soft washcloth on your skin, and a soft toothbrush to brush your teeth. This can keep your skin and gums from bleeding. If you shave, use an electric shaver. Do not play contact sports.       Tell your dentist and other healthcare providers that you take a blood thinner. Wear a bracelet or necklace that says you take this medicine.       Do not start or stop any other medicines unless your healthcare provider tells you to. Many medicines cannot be used with blood thinners.      Take your blood thinner exactly as prescribed by your healthcare provider. Do not skip does or take less than prescribed. Tell your provider right away if you forget to take your blood thinner, or if you take too much.      Warfarin is a blood thinner that you may need to take. The following are things you should be aware of if you take warfarin:   Foods and medicines can affect the amount of warfarin in your blood. Do not make major changes to your diet while you take warfarin. Warfarin works best when you eat about the same amount of vitamin K every day. Vitamin K is found in green leafy vegetables and certain other foods. Ask for more information about what to eat when you are taking warfarin.      You will need to see your healthcare provider for follow-up visits when you are on warfarin. You will need regular blood tests. These tests are used to decide how much medicine you need.       Other medicines may be given to open the arteries to your heart, slow your heartbeat, or decrease your blood pressure or cholesterol.       Do not take certain medicines without asking your healthcare provider first. These include NSAIDs, herbal or vitamin supplements, or hormones (estrogen or progestin).       Take your medicine as directed. Contact your healthcare provider if you think your medicine is not helping or if you have side effects. Tell him or her if you are allergic to any medicine. Keep a list of the medicines, vitamins, and herbs you take. Include the amounts, and when and why you take them. Bring the list or the pill bottles to follow-up visits. Carry your medicine list with you in case of an emergency.    Follow up with your healthcare provider as directed: Keep a record or a calendar with details about your chest pain. Every time you have pain or symptoms, record what the pain is like, how long it lasts, and how severe it is. Also record what you are doing when the pain starts, and what makes it go away. Bring this with you every time you see your healthcare provider. Write down your questions so you remember to ask them during your visits.    Cardiac rehabilitation: Your healthcare provider may recommend that you attend cardiac rehabilitation (rehab). This is a program run by specialists who will help you safely strengthen your heart and prevent more heart disease. The plan includes exercise, relaxation, stress management, and heart-healthy nutrition. Healthcare providers will also check to make sure any medicines you are taking are working. The plan may also include instructions for when you can drive, return to work, and do other normal daily activities.     Manage angina:     Do not smoke. Nicotine and other chemicals in cigarettes and cigars can cause heart and lung damage. Ask your healthcare provider for information if you currently smoke and need help to quit. E-cigarettes or smokeless tobacco still contain nicotine. Talk to your healthcare provider before you use these products.       Maintain a healthy weight. When you weigh more than is healthy for you, your heart must work harder. You are at higher risk for serious health problems if you are overweight. Ask your healthcare provider how much you should weigh. Ask him to help you create a weight loss plan if you are overweight.       Ask about activity. Your healthcare provider will tell you which activities to limit or avoid. Ask about the best exercise plan for you.      Eat heart-healthy foods. Include fresh fruits and vegetables in your meal plan. Choose low-fat foods, such as skim or 1% fat milk, low-fat cheese and yogurt, fish, chicken (without skin), and lean meats. Eat two 4-ounce servings of fish high in omega-3 fats each week, such as salmon, fresh tuna, and herring. Do not eat foods that are high in sodium, such as canned foods, potato chips, salty snacks, and cold cuts. Put less table salt on your food. Sources of Omega 3           Avoid activities that cause angina. Pay attention to your symptoms and find out what seems to make your angina worse.       Ask if you should have a flu vaccine. The flu vaccine will decrease your risk for an infection. An infection can put more stress on your heart and worsen your angina.

## 2020-08-02 NOTE — ED PROVIDER NOTE - NSFOLLOWUPCLINICS_GEN_ALL_ED_FT
[facility]  Cardiology  [address] [place] [address]  Phone: [phone]  Fax:   Follow Up Time:     [facility]  [facility]iology  [address], [address]  [place] [address]  Phone: [phone]  Fax:   Follow Up Time:     [facility]  Cardiology  [address], [address]  [place] [address]  Phone: [phone]  Fax:   Follow Up Time:

## 2020-08-02 NOTE — ED PROVIDER NOTE - CLINICAL SUMMARY MEDICAL DECISION MAKING FREE TEXT BOX
38 y/o M with h/o ADHD (Adderall) pw left sided chest discomfort last night last night lasting 5 minutes, non-radiating, at rest NOT associated with shortness of breath. Took 2 ASA. Experienced similar episode today again at rest, lasting a few minutes took 2 more ASA. Pain resolved. Denies fever, chills, cough, shortness of breath, nausea, vomiting, weakness, numbness, tingling. Denies recent travel, recent illness. Will obtain basic labs with Trop, EKG, CXR and reassess. Will likely need cardio outpt follow up. 40 y/o M with h/o ADHD (Adderall) pw left sided chest discomfort last night last night lasting 5 minutes, non-radiating, at rest NOT associated with shortness of breath. Took 2 ASA. Experienced similar episode today again while riding his bike lasting a few minutes, took 2 more ASA, pain resolved. Denies fever, chills, cough, shortness of breath, nausea, vomiting, weakness, numbness, tingling. Denies recent travel, recent illness.  (+) daily smoker (+) fam hx- DAD MI age 54   Plan: Will obtain basic labs with Trop, EKG, CXR and reassess. Consider Obs admit secondary to risk factors. 38 y/o M with h/o ADHD (Adderall) pw left sided chest discomfort last night last night lasting 5 minutes, non-radiating, at rest NOT associated with shortness of breath. Took 2 ASA. Experienced similar episode today again while riding his bike lasting a few minutes, took 2 more ASA, pain resolved. Denies fever, chills, cough, shortness of breath, nausea, vomiting, weakness, numbness, tingling. Denies recent travel, recent illness.  (+) daily smoker (+) fam hx- DAD MI age 54   Plan: Will obtain basic labs with Trop, EKG, CXR and reassess. Consider Obs admit secondary to risk factors.  **Update- trop negative, EKG stable, CXR NAPF. Will admit to OBS for r/o ACS. Accepted by Dr. Gonsales

## 2020-08-02 NOTE — ED PROVIDER NOTE - PROGRESS NOTE DETAILS
PA TIberio- trop negative, EKG stable, CXR NAPF. Will admit to OBS for r/o ACS. Accepted by Dr. Gonsales pt offered obs for stress and echo, adamantly refused, wants to go home and come back, The pt is clinically sober, AA&Ox3, free from distracting injury.  Throughout our interactions in the ED today, the pt has demonstrated concrete thinking/reasoning, has maintained an orderly/reasonable conversation, appears to have intact insight/judgment/reason and therefore in our opinion has capacity to make decisions.  Given the pt’s presentation, we communicated our concern for ACS in laymans terms.  The pt verbalized an understanding of our worries. We’ve told the patient that his workup is incomplete. Our discussions included the potential outcomes of leaving, including worsening of their condition, becoming critically ill, or death. Despite these efforts, we were unable to convince the pt to stay.  The pt is refusing any further care. Will DC home with cardiology follow up. Pt. states he will come back to the ED if he feels worse.

## 2020-08-02 NOTE — ED PROVIDER NOTE - ATTENDING CONTRIBUTION TO CARE
I have personally seen and examined this patient.  I have fully participated in the care of this patient. I have reviewed all pertinent clinical information, including history, physical exam, plan and the PA's note and agree except as noted.  38yo smoker no sig pmhx p/w CC chest pain since last night, got worse while riding bike today, +family history of CAD. Denies fever chills sob n/v/d numbness/tingling/weakness  Gen: Mild distress  HEENT: NCAT, EOMI, PERRLA, mucous membranes moist  CV: RRR, S1S2  Resp: CTAB  GI: Abdomen soft, NT, ND. No rebound, no guarding.  Neuro: A&Ox3, no motor or sensory deficits, no focal deficits.   Skin: Warm, dry intact. No rashes.  will send labs trop ekg cxr reassess possibly obs for stress/echo

## 2020-08-02 NOTE — H&P ADULT - NSHPPHYSICALEXAM_GEN_ALL_CORE
Vital Signs Last 24 Hrs  T(C): 36.6 (02 Aug 2020 16:57), Max: 36.6 (02 Aug 2020 16:57)  T(F): 97.8 (02 Aug 2020 16:57), Max: 97.8 (02 Aug 2020 16:57)  HR: 95 (02 Aug 2020 18:11) (91 - 96)  BP: 106/80 (02 Aug 2020 18:11) (101/78 - 106/80)  BP(mean): 89 (02 Aug 2020 18:11) (86 - 89)  RR: 20 (02 Aug 2020 18:11) (17 - 20)  SpO2: 100% (02 Aug 2020 18:11) (97% - 100%)

## 2020-08-02 NOTE — H&P ADULT - HISTORY OF PRESENT ILLNESS
38 y/o M with h/o ADHD (Adderall) pw left sided chest discomfort last night last night lasting 5 minutes, non-radiating, at rest NOT associated with shortness of breath. Took 2 ASA. Experienced similar episode today again while riding his bike lasting a few minutes, took 2 more ASA, pain resolved. Denies fever, chills, cough, shortness of breath, nausea, vomiting, weakness, numbness, tingling. Denies recent travel, recent illness.   	(+) daily smoker  (+) fam hx- DAD MI age 54

## 2020-08-02 NOTE — H&P ADULT - NSHPLABSRESULTS_GEN_ALL_CORE
12.8   9.63  )-----------( 198      ( 02 Aug 2020 17:25 )             39.2       08-02    138  |  102  |  14  ----------------------------<  90  3.8   |  27  |  1.03    Ca    9.1      02 Aug 2020 17:25    TPro  6.8  /  Alb  3.7  /  TBili  0.9  /  DBili  x   /  AST  134<H>  /  ALT  70<H>  /  AlkPhos  57  08-02        CARDIAC MARKERS ( 02 Aug 2020 17:25 )  <.017 ng/mL / x     / x     / x     / x          Labs reviewed:     CXR personally reviewed: Clear lung fields bilaterally    ECG reviewed and interpreted: sinus

## 2020-08-02 NOTE — ED PROVIDER NOTE - OBJECTIVE STATEMENT
38 y/o M with h/o ADHD (Adderall) pw left sided chest discomfort last night last night lasting 5 minutes, non-radiating, at rest NOT associated with shortness of breath. Took 2 ASA. Experienced similar episode today again at rest, lasting a few minutes took 2 more ASA. Pain resolved. Denies fever, chills, cough, shortness of breath, nausea, vomiting, weakness, numbness, tingling. Denies recent travel, recent illness.   (+) daily smoker  (+) fam hx- DAD MI age 54 40 y/o M with h/o ADHD (Adderall) pw left sided chest discomfort last night last night lasting 5 minutes, non-radiating, at rest NOT associated with shortness of breath. Took 2 ASA. Experienced similar episode today again while riding his bike lasting a few minutes, took 2 more ASA, pain resolved. Denies fever, chills, cough, shortness of breath, nausea, vomiting, weakness, numbness, tingling. Denies recent travel, recent illness.   (+) daily smoker  (+) fam hx- DAD MI age 54

## 2020-08-02 NOTE — ED PROVIDER NOTE - PATIENT PORTAL LINK FT
You can access the FollowMyHealth Patient Portal offered by [facility] [place] by registering at the following website: [email or web address]. By joining [email or web address]’s FollowMyHealth portal, you will also be able to view your health information using other applications (apps) compatible with our system.

## 2020-08-03 LAB
SARS-COV-2 IGG SERPL QL IA: NEGATIVE — SIGNIFICANT CHANGE UP
SARS-COV-2 IGM SERPL IA-ACNC: 0.07 INDEX — SIGNIFICANT CHANGE UP

## 2020-09-22 ENCOUNTER — APPOINTMENT (OUTPATIENT)
Dept: FAMILY MEDICINE | Facility: CLINIC | Age: 40
End: 2020-09-22
Payer: MEDICAID

## 2020-09-22 VITALS
WEIGHT: 135 LBS | OXYGEN SATURATION: 97 % | HEIGHT: 69 IN | TEMPERATURE: 98.5 F | RESPIRATION RATE: 16 BRPM | SYSTOLIC BLOOD PRESSURE: 110 MMHG | HEART RATE: 126 BPM | BODY MASS INDEX: 19.99 KG/M2 | DIASTOLIC BLOOD PRESSURE: 80 MMHG

## 2020-09-22 PROCEDURE — 99213 OFFICE O/P EST LOW 20 MIN: CPT

## 2020-09-22 NOTE — PHYSICAL EXAM
[No Acute Distress] : no acute distress [Well Nourished] : well nourished [Well Developed] : well developed [Well-Appearing] : well-appearing [Normal Sclera/Conjunctiva] : normal sclera/conjunctiva [Normal Oropharynx] : the oropharynx was normal [No JVD] : no jugular venous distention [Normal Rate] : normal rate  [Regular Rhythm] : with a regular rhythm [No Murmur] : no murmur heard [No Edema] : there was no peripheral edema [Soft] : abdomen soft [Non Tender] : non-tender [Non-distended] : non-distended [No HSM] : no HSM [Normal Bowel Sounds] : normal bowel sounds [de-identified] : Limited range of motion left shoulder

## 2020-09-22 NOTE — HISTORY OF PRESENT ILLNESS
[FreeTextEntry1] : ADD [de-identified] : Patient here in follow-up on ADD ADHD he feels well is functioning well on his Adderall he feels this is particularly important for him to be able to concentrate has recently lost his job due to the COVID epidemic.  He also is complaining of some left shoulder pain which she has had for some time but he is having increasingly reduced range of motion this dates from time he played football when he was 14 years of age

## 2020-10-06 NOTE — ED ADULT NURSE NOTE - DOES PATIENT HAVE ADVANCE DIRECTIVE
Continue with inhalers    Flu shot     CT chest when able     Keep exercising     Will need download from [employer] machine at next visit.   Bring in chip     Follow up in 3 months No

## 2020-12-22 ENCOUNTER — NON-APPOINTMENT (OUTPATIENT)
Age: 40
End: 2020-12-22

## 2020-12-27 NOTE — ED ADULT NURSE NOTE - NSSISCREENINGQ3_ED_A_ED
No
[facility]  [facility]  [address] [place] [address]  Phone: [phone]  Fax: [phone]  Follow Up Time:

## 2021-02-16 ENCOUNTER — EMERGENCY (EMERGENCY)
Facility: HOSPITAL | Age: 41
LOS: 1 days | Discharge: HOME | End: 2021-02-16
Attending: INTERNAL MEDICINE | Admitting: INTERNAL MEDICINE
Payer: MEDICAID

## 2021-02-16 ENCOUNTER — APPOINTMENT (OUTPATIENT)
Dept: FAMILY MEDICINE | Facility: CLINIC | Age: 41
End: 2021-02-16
Payer: MEDICAID

## 2021-02-16 VITALS
SYSTOLIC BLOOD PRESSURE: 110 MMHG | BODY MASS INDEX: 19.9 KG/M2 | DIASTOLIC BLOOD PRESSURE: 70 MMHG | HEART RATE: 101 BPM | OXYGEN SATURATION: 96 % | TEMPERATURE: 97.7 F | HEIGHT: 69 IN | WEIGHT: 134.38 LBS | RESPIRATION RATE: 14 BRPM

## 2021-02-16 VITALS
RESPIRATION RATE: 18 BRPM | SYSTOLIC BLOOD PRESSURE: 133 MMHG | DIASTOLIC BLOOD PRESSURE: 94 MMHG | HEIGHT: 68 IN | WEIGHT: 134.04 LBS | OXYGEN SATURATION: 98 % | HEART RATE: 114 BPM

## 2021-02-16 VITALS
DIASTOLIC BLOOD PRESSURE: 82 MMHG | HEART RATE: 92 BPM | SYSTOLIC BLOOD PRESSURE: 128 MMHG | RESPIRATION RATE: 18 BRPM | OXYGEN SATURATION: 98 %

## 2021-02-16 LAB
ALBUMIN SERPL ELPH-MCNC: 3.9 G/DL — SIGNIFICANT CHANGE UP (ref 3.3–5)
ALP SERPL-CCNC: 71 U/L — SIGNIFICANT CHANGE UP (ref 40–120)
ALT FLD-CCNC: 27 U/L — SIGNIFICANT CHANGE UP (ref 10–45)
AMPHET UR-MCNC: NEGATIVE — SIGNIFICANT CHANGE UP
ANION GAP SERPL CALC-SCNC: 8 MMOL/L — SIGNIFICANT CHANGE UP (ref 5–17)
AST SERPL-CCNC: 19 U/L — SIGNIFICANT CHANGE UP (ref 10–40)
BARBITURATES UR SCN-MCNC: NEGATIVE — SIGNIFICANT CHANGE UP
BASOPHILS # BLD AUTO: 0.05 K/UL — SIGNIFICANT CHANGE UP (ref 0–0.2)
BASOPHILS NFR BLD AUTO: 0.3 % — SIGNIFICANT CHANGE UP (ref 0–2)
BENZODIAZ UR-MCNC: NEGATIVE — SIGNIFICANT CHANGE UP
BILIRUB SERPL-MCNC: 0.2 MG/DL — SIGNIFICANT CHANGE UP (ref 0.2–1.2)
BUN SERPL-MCNC: 24 MG/DL — HIGH (ref 7–23)
CALCIUM SERPL-MCNC: 8.3 MG/DL — LOW (ref 8.4–10.5)
CHLORIDE SERPL-SCNC: 107 MMOL/L — SIGNIFICANT CHANGE UP (ref 96–108)
CO2 SERPL-SCNC: 26 MMOL/L — SIGNIFICANT CHANGE UP (ref 22–31)
COCAINE METAB.OTHER UR-MCNC: NEGATIVE — SIGNIFICANT CHANGE UP
CREAT SERPL-MCNC: 0.97 MG/DL — SIGNIFICANT CHANGE UP (ref 0.5–1.3)
EOSINOPHIL # BLD AUTO: 0.03 K/UL — SIGNIFICANT CHANGE UP (ref 0–0.5)
EOSINOPHIL NFR BLD AUTO: 0.2 % — SIGNIFICANT CHANGE UP (ref 0–6)
ETHANOL SERPL-MCNC: <3 MG/DL — SIGNIFICANT CHANGE UP (ref 0–3)
GLUCOSE SERPL-MCNC: 158 MG/DL — HIGH (ref 70–99)
HCT VFR BLD CALC: 42.6 % — SIGNIFICANT CHANGE UP (ref 39–50)
HGB BLD-MCNC: 13.4 G/DL — SIGNIFICANT CHANGE UP (ref 13–17)
IMM GRANULOCYTES NFR BLD AUTO: 1.1 % — SIGNIFICANT CHANGE UP (ref 0–1.5)
LYMPHOCYTES # BLD AUTO: 1.48 K/UL — SIGNIFICANT CHANGE UP (ref 1–3.3)
LYMPHOCYTES # BLD AUTO: 9.2 % — LOW (ref 13–44)
MCHC RBC-ENTMCNC: 30.6 PG — SIGNIFICANT CHANGE UP (ref 27–34)
MCHC RBC-ENTMCNC: 31.5 GM/DL — LOW (ref 32–36)
MCV RBC AUTO: 97.3 FL — SIGNIFICANT CHANGE UP (ref 80–100)
METHADONE UR-MCNC: POSITIVE
MONOCYTES # BLD AUTO: 0.98 K/UL — HIGH (ref 0–0.9)
MONOCYTES NFR BLD AUTO: 6.1 % — SIGNIFICANT CHANGE UP (ref 2–14)
NEUTROPHILS # BLD AUTO: 13.36 K/UL — HIGH (ref 1.8–7.4)
NEUTROPHILS NFR BLD AUTO: 83.1 % — HIGH (ref 43–77)
NRBC # BLD: 0 /100 WBCS — SIGNIFICANT CHANGE UP (ref 0–0)
OPIATES UR-MCNC: NEGATIVE — SIGNIFICANT CHANGE UP
PCP SPEC-MCNC: SIGNIFICANT CHANGE UP
PCP UR-MCNC: NEGATIVE — SIGNIFICANT CHANGE UP
PLATELET # BLD AUTO: 242 K/UL — SIGNIFICANT CHANGE UP (ref 150–400)
POTASSIUM SERPL-MCNC: 4.2 MMOL/L — SIGNIFICANT CHANGE UP (ref 3.5–5.3)
POTASSIUM SERPL-SCNC: 4.2 MMOL/L — SIGNIFICANT CHANGE UP (ref 3.5–5.3)
PROT SERPL-MCNC: 7.2 G/DL — SIGNIFICANT CHANGE UP (ref 6–8.3)
RBC # BLD: 4.38 M/UL — SIGNIFICANT CHANGE UP (ref 4.2–5.8)
RBC # FLD: 12.7 % — SIGNIFICANT CHANGE UP (ref 10.3–14.5)
SODIUM SERPL-SCNC: 141 MMOL/L — SIGNIFICANT CHANGE UP (ref 135–145)
THC UR QL: POSITIVE
TROPONIN I SERPL-MCNC: <.017 NG/ML — LOW (ref 0.02–0.06)
WBC # BLD: 16.07 K/UL — HIGH (ref 3.8–10.5)
WBC # FLD AUTO: 16.07 K/UL — HIGH (ref 3.8–10.5)

## 2021-02-16 PROCEDURE — 36415 COLL VENOUS BLD VENIPUNCTURE: CPT

## 2021-02-16 PROCEDURE — 99072 ADDL SUPL MATRL&STAF TM PHE: CPT

## 2021-02-16 PROCEDURE — 99213 OFFICE O/P EST LOW 20 MIN: CPT

## 2021-02-16 PROCEDURE — 85025 COMPLETE CBC W/AUTO DIFF WBC: CPT

## 2021-02-16 PROCEDURE — 93005 ELECTROCARDIOGRAM TRACING: CPT

## 2021-02-16 PROCEDURE — 99285 EMERGENCY DEPT VISIT HI MDM: CPT | Mod: 25

## 2021-02-16 PROCEDURE — 80307 DRUG TEST PRSMV CHEM ANLYZR: CPT

## 2021-02-16 PROCEDURE — 96375 TX/PRO/DX INJ NEW DRUG ADDON: CPT

## 2021-02-16 PROCEDURE — 84484 ASSAY OF TROPONIN QUANT: CPT

## 2021-02-16 PROCEDURE — 96365 THER/PROPH/DIAG IV INF INIT: CPT

## 2021-02-16 PROCEDURE — 99285 EMERGENCY DEPT VISIT HI MDM: CPT

## 2021-02-16 PROCEDURE — 80053 COMPREHEN METABOLIC PANEL: CPT

## 2021-02-16 PROCEDURE — 93010 ELECTROCARDIOGRAM REPORT: CPT

## 2021-02-16 RX ORDER — MAGNESIUM SULFATE 500 MG/ML
1 VIAL (ML) INJECTION ONCE
Refills: 0 | Status: COMPLETED | OUTPATIENT
Start: 2021-02-16 | End: 2021-02-16

## 2021-02-16 RX ORDER — ONDANSETRON 8 MG/1
4 TABLET, FILM COATED ORAL ONCE
Refills: 0 | Status: COMPLETED | OUTPATIENT
Start: 2021-02-16 | End: 2021-02-16

## 2021-02-16 RX ORDER — SODIUM CHLORIDE 9 MG/ML
1000 INJECTION INTRAMUSCULAR; INTRAVENOUS; SUBCUTANEOUS
Refills: 0 | Status: DISCONTINUED | OUTPATIENT
Start: 2021-02-16 | End: 2021-02-20

## 2021-02-16 RX ORDER — THIAMINE MONONITRATE (VIT B1) 100 MG
100 TABLET ORAL ONCE
Refills: 0 | Status: COMPLETED | OUTPATIENT
Start: 2021-02-16 | End: 2021-02-16

## 2021-02-16 RX ORDER — SODIUM CHLORIDE 9 MG/ML
1000 INJECTION INTRAMUSCULAR; INTRAVENOUS; SUBCUTANEOUS ONCE
Refills: 0 | Status: COMPLETED | OUTPATIENT
Start: 2021-02-16 | End: 2021-02-16

## 2021-02-16 RX ADMIN — Medication 100 MILLIGRAM(S): at 18:27

## 2021-02-16 RX ADMIN — Medication 1 GRAM(S): at 18:27

## 2021-02-16 RX ADMIN — SODIUM CHLORIDE 1000 MILLILITER(S): 9 INJECTION INTRAMUSCULAR; INTRAVENOUS; SUBCUTANEOUS at 18:27

## 2021-02-16 RX ADMIN — SODIUM CHLORIDE 1000 MILLILITER(S): 9 INJECTION INTRAMUSCULAR; INTRAVENOUS; SUBCUTANEOUS at 17:02

## 2021-02-16 RX ADMIN — Medication 100 GRAM(S): at 17:04

## 2021-02-16 RX ADMIN — SODIUM CHLORIDE 1000 MILLILITER(S): 9 INJECTION INTRAMUSCULAR; INTRAVENOUS; SUBCUTANEOUS at 17:04

## 2021-02-16 RX ADMIN — ONDANSETRON 4 MILLIGRAM(S): 8 TABLET, FILM COATED ORAL at 17:04

## 2021-02-16 NOTE — PLAN
[FreeTextEntry1] : Patient will be sent there for routine laboratory work his medication will be refilled and he will be followed up in 6 months time of visit 22 minutes

## 2021-02-16 NOTE — ED PROVIDER NOTE - OBJECTIVE STATEMENT
40 y m stated had drinks was found unresponsive rx with nascan 4 mg nasally woke up vomiting in ed pt admitted to marijuana, alcohol pupils pin point noted by ems in ed pt was conscious no neuro deficit , normal ambulation one episode of vomiting 40 y m stated had drinks was found unresponsive rx with nascan 4 mg nasally woke up vomiting in ed pt admitted to marijuana, alcohol pupils pin point noted by ems in ed pt was conscious no neuro deficit , normal ambulation one episode of vomiting  as per ems  Pt BIBA from home, was found unresponsive by sister at home with pinpoint pupils, was given 2 mg Narcan IN, awake on arrival to ED, admitted to drinking beer and smoking weed, and taking his ADHD medication  overdose

## 2021-02-16 NOTE — ED PROVIDER NOTE - PATIENT PORTAL LINK FT
You can access the FollowMyHealth Patient Portal offered by [facility] by registering at the following website: [email or web address]. By joining [employer]’s FollowMyHealth portal, you will also be able to view your health information using other applications (apps) compatible with our system.

## 2021-02-16 NOTE — ED PROVIDER NOTE - NSFOLLOWUPCLINICS_GEN_ALL_ED_FT
[facility]  [address] [place] [address]  Phone: [phone]  Fax:   Follow Up Time:

## 2021-02-16 NOTE — ED PROVIDER NOTE - CLINICAL SUMMARY MEDICAL DECISION MAKING FREE TEXT BOX
Pt BIBA from home, was found unresponsive by sister at home with pinpoint pupils, was given 2 mg Narcan IN, awake on arrival to ED, admitted to drinking beer and smoking weed, and taking his ADHD medication  overdose  alcolol less than 3   u tox + pt observed in ed   pt expressed no harm to himself or others

## 2021-02-16 NOTE — HISTORY OF PRESENT ILLNESS
[FreeTextEntry1] : ADD/ADHD [de-identified] : Patient seen here in follow-up on ADD ADHD he is doing well on Adderall he is functioning well has a new job where he is working with [employer].  Review of systems is unremarkable he has not had any laboratory work or other test recently has not seen any physicians

## 2021-02-16 NOTE — ED ADULT NURSE NOTE - OBJECTIVE STATEMENT
Patient was brought in by EMS from home for unresponsiveness, as per EMS, patient was observed unresponsive at home was given narcan in the field and was aroused. Patient presented to ED, alert and responsive with mild lethargy. as per patient he smoked weed and fell asleep, when he woke up EMS was at [facility]. patient denies SI,HI,AH,VH.

## 2021-02-16 NOTE — ED ADULT TRIAGE NOTE - CHIEF COMPLAINT QUOTE
Pt BIBA from home, was found unresponsive by sister at home with pinpoint pupils, was given 2 mg Narcan IN, awake on arrival to ED, admitted to drinking beer and smoking weed, and taking his ADHD medication

## 2021-05-03 NOTE — ED PROVIDER NOTE - NS ED MD EM SELECTION
baclofen 10 mg oral tablet: 1 tab(s) orally 2 times a day  Biktarvy oral tablet: 1 tab(s) orally once a day  gabapentin 100 mg oral capsule: 1 tab(s) orally 3 times a day  nicotine 14 mg/24 hr transdermal film, extended release: 1 patch transdermally once a day   SEROquel 200 mg oral tablet: 1 tab(s) orally once a day (at bedtime)  
[id number] Comprehensive

## 2021-06-12 ENCOUNTER — EMERGENCY (EMERGENCY)
Facility: HOSPITAL | Age: 41
LOS: 0 days | Discharge: HOME | End: 2021-06-12
Attending: EMERGENCY MEDICINE
Payer: MEDICAID

## 2021-06-12 VITALS — WEIGHT: 149.91 LBS | HEIGHT: 67 IN

## 2021-06-12 VITALS
SYSTOLIC BLOOD PRESSURE: 101 MMHG | DIASTOLIC BLOOD PRESSURE: 62 MMHG | HEART RATE: 63 BPM | TEMPERATURE: 98 F | OXYGEN SATURATION: 99 % | RESPIRATION RATE: 18 BRPM

## 2021-06-12 DIAGNOSIS — Z87.442 PERSONAL HISTORY OF URINARY CALCULI: ICD-10-CM

## 2021-06-12 DIAGNOSIS — R10.84 GENERALIZED ABDOMINAL PAIN: ICD-10-CM

## 2021-06-12 DIAGNOSIS — Z79.899 OTHER LONG TERM (CURRENT) DRUG THERAPY: ICD-10-CM

## 2021-06-12 DIAGNOSIS — Z88.8 ALLERGY STATUS TO OTHER DRUGS, MEDICAMENTS AND BIOLOGICAL SUBSTANCES STATUS: ICD-10-CM

## 2021-06-12 LAB
ALBUMIN SERPL ELPH-MCNC: 4 G/DL — SIGNIFICANT CHANGE UP (ref 3.3–5)
ALP SERPL-CCNC: 55 U/L — SIGNIFICANT CHANGE UP (ref 40–120)
ALT FLD-CCNC: 32 U/L — SIGNIFICANT CHANGE UP (ref 12–78)
ANION GAP SERPL CALC-SCNC: 5 MMOL/L — SIGNIFICANT CHANGE UP (ref 5–17)
APPEARANCE UR: CLEAR — SIGNIFICANT CHANGE UP
AST SERPL-CCNC: 38 U/L — HIGH (ref 15–37)
BASOPHILS # BLD AUTO: 0.06 K/UL — SIGNIFICANT CHANGE UP (ref 0–0.2)
BASOPHILS NFR BLD AUTO: 0.8 % — SIGNIFICANT CHANGE UP (ref 0–2)
BILIRUB SERPL-MCNC: 0.5 MG/DL — SIGNIFICANT CHANGE UP (ref 0.2–1.2)
BILIRUB UR-MCNC: NEGATIVE — SIGNIFICANT CHANGE UP
BUN SERPL-MCNC: 12 MG/DL — SIGNIFICANT CHANGE UP (ref 7–23)
CALCIUM SERPL-MCNC: 8.4 MG/DL — LOW (ref 8.5–10.1)
CHLORIDE SERPL-SCNC: 108 MMOL/L — SIGNIFICANT CHANGE UP (ref 96–108)
CO2 SERPL-SCNC: 28 MMOL/L — SIGNIFICANT CHANGE UP (ref 22–31)
COLOR SPEC: YELLOW — SIGNIFICANT CHANGE UP
CREAT SERPL-MCNC: 0.88 MG/DL — SIGNIFICANT CHANGE UP (ref 0.5–1.3)
DIFF PNL FLD: ABNORMAL
EOSINOPHIL # BLD AUTO: 0.41 K/UL — SIGNIFICANT CHANGE UP (ref 0–0.5)
EOSINOPHIL NFR BLD AUTO: 5.5 % — SIGNIFICANT CHANGE UP (ref 0–6)
GLUCOSE SERPL-MCNC: 82 MG/DL — SIGNIFICANT CHANGE UP (ref 70–99)
GLUCOSE UR QL: NEGATIVE MG/DL — SIGNIFICANT CHANGE UP
HCT VFR BLD CALC: 43.8 % — SIGNIFICANT CHANGE UP (ref 39–50)
HGB BLD-MCNC: 14 G/DL — SIGNIFICANT CHANGE UP (ref 13–17)
IMM GRANULOCYTES NFR BLD AUTO: 0.4 % — SIGNIFICANT CHANGE UP (ref 0–1.5)
KETONES UR-MCNC: NEGATIVE — SIGNIFICANT CHANGE UP
LEUKOCYTE ESTERASE UR-ACNC: NEGATIVE — SIGNIFICANT CHANGE UP
LIDOCAIN IGE QN: 109 U/L — SIGNIFICANT CHANGE UP (ref 73–393)
LYMPHOCYTES # BLD AUTO: 2.27 K/UL — SIGNIFICANT CHANGE UP (ref 1–3.3)
LYMPHOCYTES # BLD AUTO: 30.3 % — SIGNIFICANT CHANGE UP (ref 13–44)
MCHC RBC-ENTMCNC: 30.5 PG — SIGNIFICANT CHANGE UP (ref 27–34)
MCHC RBC-ENTMCNC: 32 GM/DL — SIGNIFICANT CHANGE UP (ref 32–36)
MCV RBC AUTO: 95.4 FL — SIGNIFICANT CHANGE UP (ref 80–100)
MONOCYTES # BLD AUTO: 0.43 K/UL — SIGNIFICANT CHANGE UP (ref 0–0.9)
MONOCYTES NFR BLD AUTO: 5.7 % — SIGNIFICANT CHANGE UP (ref 2–14)
NEUTROPHILS # BLD AUTO: 4.29 K/UL — SIGNIFICANT CHANGE UP (ref 1.8–7.4)
NEUTROPHILS NFR BLD AUTO: 57.3 % — SIGNIFICANT CHANGE UP (ref 43–77)
NITRITE UR-MCNC: NEGATIVE — SIGNIFICANT CHANGE UP
PH UR: 5 — SIGNIFICANT CHANGE UP (ref 5–8)
PLATELET # BLD AUTO: 215 K/UL — SIGNIFICANT CHANGE UP (ref 150–400)
POTASSIUM SERPL-MCNC: 3.7 MMOL/L — SIGNIFICANT CHANGE UP (ref 3.5–5.3)
POTASSIUM SERPL-SCNC: 3.7 MMOL/L — SIGNIFICANT CHANGE UP (ref 3.5–5.3)
PROT SERPL-MCNC: 7.3 GM/DL — SIGNIFICANT CHANGE UP (ref 6–8.3)
PROT UR-MCNC: NEGATIVE MG/DL — SIGNIFICANT CHANGE UP
RBC # BLD: 4.59 M/UL — SIGNIFICANT CHANGE UP (ref 4.2–5.8)
RBC # FLD: 12.3 % — SIGNIFICANT CHANGE UP (ref 10.3–14.5)
SODIUM SERPL-SCNC: 141 MMOL/L — SIGNIFICANT CHANGE UP (ref 135–145)
SP GR SPEC: 1.02 — SIGNIFICANT CHANGE UP (ref 1.01–1.02)
UROBILINOGEN FLD QL: NEGATIVE MG/DL — SIGNIFICANT CHANGE UP
WBC # BLD: 7.49 K/UL — SIGNIFICANT CHANGE UP (ref 3.8–10.5)
WBC # FLD AUTO: 7.49 K/UL — SIGNIFICANT CHANGE UP (ref 3.8–10.5)

## 2021-06-12 PROCEDURE — 74177 CT ABD & PELVIS W/CONTRAST: CPT

## 2021-06-12 PROCEDURE — 81001 URINALYSIS AUTO W/SCOPE: CPT

## 2021-06-12 PROCEDURE — 99284 EMERGENCY DEPT VISIT MOD MDM: CPT | Mod: 25

## 2021-06-12 PROCEDURE — 80053 COMPREHEN METABOLIC PANEL: CPT

## 2021-06-12 PROCEDURE — 99284 EMERGENCY DEPT VISIT MOD MDM: CPT

## 2021-06-12 PROCEDURE — 83690 ASSAY OF LIPASE: CPT

## 2021-06-12 PROCEDURE — 96374 THER/PROPH/DIAG INJ IV PUSH: CPT | Mod: XU

## 2021-06-12 PROCEDURE — 36415 COLL VENOUS BLD VENIPUNCTURE: CPT

## 2021-06-12 PROCEDURE — 87086 URINE CULTURE/COLONY COUNT: CPT

## 2021-06-12 PROCEDURE — 85025 COMPLETE CBC W/AUTO DIFF WBC: CPT

## 2021-06-12 PROCEDURE — 74177 CT ABD & PELVIS W/CONTRAST: CPT | Mod: 26,MA

## 2021-06-12 PROCEDURE — 96361 HYDRATE IV INFUSION ADD-ON: CPT

## 2021-06-12 RX ORDER — SODIUM CHLORIDE 9 MG/ML
500 INJECTION INTRAMUSCULAR; INTRAVENOUS; SUBCUTANEOUS ONCE
Refills: 0 | Status: COMPLETED | OUTPATIENT
Start: 2021-06-12 | End: 2021-06-12

## 2021-06-12 RX ORDER — MORPHINE SULFATE 50 MG/1
4 CAPSULE, EXTENDED RELEASE ORAL ONCE
Refills: 0 | Status: DISCONTINUED | OUTPATIENT
Start: 2021-06-12 | End: 2021-06-12

## 2021-06-12 RX ADMIN — SODIUM CHLORIDE 500 MILLILITER(S): 9 INJECTION INTRAMUSCULAR; INTRAVENOUS; SUBCUTANEOUS at 09:18

## 2021-06-12 RX ADMIN — MORPHINE SULFATE 4 MILLIGRAM(S): 50 CAPSULE, EXTENDED RELEASE ORAL at 08:44

## 2021-06-12 RX ADMIN — SODIUM CHLORIDE 500 MILLILITER(S): 9 INJECTION INTRAMUSCULAR; INTRAVENOUS; SUBCUTANEOUS at 08:18

## 2021-06-12 RX ADMIN — MORPHINE SULFATE 4 MILLIGRAM(S): 50 CAPSULE, EXTENDED RELEASE ORAL at 09:00

## 2021-06-12 NOTE — ED PROVIDER NOTE - PROGRESS NOTE DETAILS
gu exam chapered by jessica babinski RN pt feeling better abd soft nontender no rebound or guarding, will dc home. tolerated po challenge, return precautions dicussed

## 2021-06-12 NOTE — ED PROVIDER NOTE - CLINICAL SUMMARY MEDICAL DECISION MAKING FREE TEXT BOX
young m with lower abdominal cramping, ddx kidney stone, diverticulitis, appendicitis, ibs.  will tx and reassess

## 2021-06-12 NOTE — ED PROVIDER NOTE - OBJECTIVE STATEMENT
41 yo m with abdominal cramping that began this morning.  cramping is diffuse, no vomiting or diarrhea, no sick contacts, no radiation of pain.  no precipitating, exacerbating or alleviating factors. + history of kidney stones. no testicle or penile pain

## 2021-06-12 NOTE — ED PROVIDER NOTE - PRINCIPAL DIAGNOSIS
"  5/2/2017       RE: Xiomy Fraser  [address] [facility] [id number]           Dear Colleague,    Thank you for referring your patient, Xiomy Fraser, to the [facility]. Please see a copy of my visit note below.    Podiatry / Foot and Ankle Surgery Progress Note    May 2, 2017    Subject: Patient was seen for follow up on bilateral permanent nail removals. Notes the toes were doing fine until yesterday and then they started to become painful again. Here with mom. Denies fever, chills. Has been soaking.     /68  Ht 5' 7\" (1.702 m)  Wt 148 lb 6.4 oz (67.3 kg)  BMI 23.24 kg/m2    General:  Patient is alert and orientated.  NAD  Minimal localized redness to both great toes. No purulent drainage.     Assessment: s/p phenol procedures to both great toenails.     Plan:  Will order Keflex today for possible infection. Recommend using qtip to scrape the crust off of toes to let them drain more. Will follow up as needed.     Chandni Sagastume DPM, Podiatry/Foot and Ankle Surgery        Again, thank you for allowing me to participate in the care of your patient.        Sincerely,              Chandni Sagastume DPM, Podiatry/Foot and Ankle Surgery    " Abdominal pain

## 2021-06-12 NOTE — ED PROVIDER NOTE - NS ED ROS FT
Constitutional: No fever or chills  Eyes: No visual changes  HEENT: No throat pain  CV: No chest pain  Resp: No SOB no cough  GI: + abd pain  : No dysuria  MSK: No musculoskeletal pain  Skin: No rash  Neuro: No headache

## 2021-06-12 NOTE — ED PROVIDER NOTE - PHYSICAL EXAMINATION
Constitutional: young m laying in bed in moderate distress  Eyes: PERRLA EOMI  Head: Normocephalic atraumatic  Mouth: MMM  Cardiac: regular rate   Resp: Lungs CTAB  GI: Abd s/minimally ttp diffusely/nd, no rebound or guarding.  gu normal lie of testis, circumcized penis, no discharge, b/l cremasteric reflex present  Neuro: awake, alert, moving all extremities, cranial nerves 2-12 intact, sensation intact, no dysmetria.  Skin: No rashes

## 2021-06-12 NOTE — ED ADULT NURSE NOTE - NS_NURSE_DISC_TEACHING_YN_ED_ALL_ED
Rhofade Counseling: Rhofade is a topical medication which can decrease superficial blood flow where applied. Side effects are uncommon and include stinging, redness and allergic reactions. Yes

## 2021-06-12 NOTE — ED PROVIDER NOTE - PATIENT PORTAL LINK FT
You can access the FollowMyHealth Patient Portal offered by [facility] by registering at the following website: [email or web address]. By joining [employer]’s FollowMyHealth portal, you will also be able to view your health information using other applications (apps) compatible with our system.

## 2021-06-12 NOTE — ED PROVIDER NOTE - NSFOLLOWUPCLINICS_GEN_ALL_ED_FT
[facility]  Family Medicine  [address] [place] [address]  Phone: [phone]  Fax:

## 2021-06-12 NOTE — ED ADULT NURSE NOTE - OBJECTIVE STATEMENT
pt presents to ED c/o L groin pain x 1 hour, acute onset. denies urinary changes. states PMHx of kidney stones- this episode feels the same. denies n/v/abd pain. denies testicular pain or penile pain. denies penile discharge.

## 2021-06-13 LAB
CULTURE RESULTS: SIGNIFICANT CHANGE UP
SPECIMEN SOURCE: SIGNIFICANT CHANGE UP

## 2021-10-18 NOTE — ED ADULT NURSE NOTE - NS_ED_NURSE_TEACHING_TOPIC_ED_A_ED
Arrived to the [facility]. Rocephin completed. Injected into right deltoid. RN requested patient to stay x 30 mins for monitoring due to not having received medication for a few years. Patient declined. Provided education on Rocephin injection    Patient instructed to report any side affects to ordering provider. Patient tolerated well   Any issues or concerns during appointment: pain, on going. Taking home meds. Patient aware of next follow-up appointment on 10/25/21 at [facility].  Discharged home.
Digestive

## 2022-01-03 ENCOUNTER — OUTPATIENT (OUTPATIENT)
Dept: OUTPATIENT SERVICES | Facility: HOSPITAL | Age: 42
LOS: 1 days | End: 2022-01-03
Payer: MEDICAID

## 2022-01-03 DIAGNOSIS — Z20.828 CONTACT WITH AND (SUSPECTED) EXPOSURE TO OTHER VIRAL COMMUNICABLE DISEASES: ICD-10-CM

## 2022-01-03 PROCEDURE — U0005: CPT

## 2022-01-03 PROCEDURE — U0003: CPT

## 2022-03-11 ENCOUNTER — EMERGENCY (EMERGENCY)
Facility: HOSPITAL | Age: 42
LOS: 1 days | Discharge: HOME | End: 2022-03-11
Attending: STUDENT IN AN ORGANIZED HEALTH CARE EDUCATION/TRAINING PROGRAM
Payer: COMMERCIAL

## 2022-03-11 VITALS
HEIGHT: 71 IN | SYSTOLIC BLOOD PRESSURE: 115 MMHG | HEART RATE: 98 BPM | RESPIRATION RATE: 16 BRPM | OXYGEN SATURATION: 98 % | TEMPERATURE: 98 F | DIASTOLIC BLOOD PRESSURE: 83 MMHG | WEIGHT: 160.06 LBS

## 2022-03-11 PROCEDURE — 99283 EMERGENCY DEPT VISIT LOW MDM: CPT | Mod: 25

## 2022-03-11 PROCEDURE — 73030 X-RAY EXAM OF SHOULDER: CPT | Mod: 26,LT

## 2022-03-11 PROCEDURE — 99284 EMERGENCY DEPT VISIT MOD MDM: CPT

## 2022-03-11 PROCEDURE — 93005 ELECTROCARDIOGRAM TRACING: CPT

## 2022-03-11 PROCEDURE — 73030 X-RAY EXAM OF SHOULDER: CPT

## 2022-03-11 PROCEDURE — 93010 ELECTROCARDIOGRAM REPORT: CPT

## 2022-03-11 RX ORDER — ACETAMINOPHEN 500 MG
650 TABLET ORAL ONCE
Refills: 0 | Status: COMPLETED | OUTPATIENT
Start: 2022-03-11 | End: 2022-03-11

## 2022-03-11 RX ADMIN — Medication 650 MILLIGRAM(S): at 19:48

## 2022-03-11 NOTE — ED STATDOCS - CARE PROVIDER_API CALL
Jean Claude Zaldivar (DO)  Orthopedics  [address] [place] [id number]  Phone: [phone]  Fax: [phone]  Follow Up Time:

## 2022-03-11 NOTE — ED STATDOCS - ATTENDING CONTRIBUTION TO CARE
I, Kathe Stubbs, performed a face to face bedside interview with this patient regarding history of present illness, review of symptoms and relevant past medical, social and family history.  I completed an independent physical examination. Medical decision making, follow-up on ordered tests (ie labs, radiologic studies) and re-evaluation of the patient's status has been communicated to the ACP.  Disposition of the patient will be based on test outcome and response to ED interventions.

## 2022-03-11 NOTE — ED STATDOCS - CLINICAL SUMMARY MEDICAL DECISION MAKING FREE TEXT BOX
Patient presents with L shoulder pain. Will obtain x-ray to r/o bony injury, supportive care, reassess.

## 2022-03-11 NOTE — ED ADULT NURSE REASSESSMENT NOTE - NS ED NURSE REASSESS COMMENT FT1
Assumed care of pt at 19:15 as stated in report from CAMERON Barakat. Charting as noted. Patient A&O x3, endorses lft shoulder pain/discomfort, denies CP/SOB. Updated on the plan of care, awaiting XR results and disposition.

## 2022-03-11 NOTE — ED STATDOCS - OBJECTIVE STATEMENT
40 y/o male with no PMHx, presents to the ED c/o L shoulder pain. States he was lifting a crate multiple times that's about 25lbs, and he felt like his arm gave out. No direct trauma. No medications PTA. Denies any other pain. Denies headache, chest pain, SOB, or abdominal pain. R hand dominate.

## 2022-03-11 NOTE — ED STATDOCS - PATIENT PORTAL LINK FT
You can access the FollowMyHealth Patient Portal offered by [facility] by registering at the following website: [email or web address]. By joining [employer]’s FollowMyHealth portal, you will also be able to view your health information using other applications (apps) compatible with our system.

## 2022-03-11 NOTE — ED ADULT NURSE REASSESSMENT NOTE - NURSING MUSC ROM
Patient was instructed to go to emergency room for weakness on the right upper extremity  LEVI - Dr Emerita Garsia - - -

## 2022-03-11 NOTE — ED STATDOCS - PROGRESS NOTE DETAILS
POLO- xray negative, Pt reassessed, pt feeling better at this time, vss, pt able to walk, talk and vocalized plan of action. Discussed in depth and explained to pt in depth the next steps that need to be taking including proper follow up with PCP or specialists. All incidental findings were discussed with pt as well. Pt verbalized their concerns and all questions were answered. Pt understands dispo and wants discharge. Given good instructions when to return to ED and importance of f/u. POLO- PT evaluated by intake physician. HPI/PE/ROS as noted above. Will follow up plan per intake physician

## 2022-03-11 NOTE — ED STATDOCS - PHYSICAL EXAMINATION
Vital Signs per nursing documentation  Gen: well appearing, no acute distress  HEENT: NCAT, MMM  Cardiac: regular rate rhythm, normal S1S2  Chest: clear to auscultation bilateral, no wheezes or crackles  Abdomen: soft, non tender non distended  Extremity:  good perfusion. ROM intact. No gross deformity. Strength intact.   Skin: no rash  Neuro: nonfocal neuro exam, gait steady

## 2022-03-21 ENCOUNTER — NON-APPOINTMENT (OUTPATIENT)
Age: 42
End: 2022-03-21

## 2022-03-21 ENCOUNTER — APPOINTMENT (OUTPATIENT)
Dept: FAMILY MEDICINE | Facility: CLINIC | Age: 42
End: 2022-03-21
Payer: MEDICAID

## 2022-03-21 VITALS
WEIGHT: 136 LBS | OXYGEN SATURATION: 98 % | BODY MASS INDEX: 20.14 KG/M2 | HEART RATE: 70 BPM | HEIGHT: 69 IN | DIASTOLIC BLOOD PRESSURE: 74 MMHG | SYSTOLIC BLOOD PRESSURE: 112 MMHG | TEMPERATURE: 97.7 F

## 2022-03-21 PROCEDURE — 99213 OFFICE O/P EST LOW 20 MIN: CPT

## 2022-03-21 RX ORDER — CHOLECALCIFEROL (VITAMIN D3) 1250 MCG
1.25 MG CAPSULE ORAL
Qty: 8 | Refills: 1 | Status: COMPLETED | COMMUNITY
Start: 2019-02-26 | End: 2022-03-21

## 2022-03-21 RX ORDER — MELOXICAM 7.5 MG/1
7.5 TABLET ORAL DAILY
Qty: 60 | Refills: 0 | Status: COMPLETED | COMMUNITY
Start: 2020-02-03 | End: 2022-03-21

## 2022-03-21 RX ORDER — DEXTROAMPHETAMINE SACCHARATE, AMPHETAMINE ASPARTATE, DEXTROAMPHETAMINE SULFATE AND AMPHETAMINE SULFATE 2.5; 2.5; 2.5; 2.5 MG/1; MG/1; MG/1; MG/1
10 TABLET ORAL DAILY
Qty: 45 | Refills: 0 | Status: COMPLETED | COMMUNITY
Start: 2018-08-08 | End: 2022-03-21

## 2022-03-21 RX ORDER — PLANT STANOL ESTER 450 MG
1250-130 TABLET ORAL
Qty: 30 | Refills: 0 | Status: COMPLETED | COMMUNITY
Start: 2018-08-08 | End: 2022-03-21

## 2022-03-21 RX ORDER — LIDOCAINE 5% 700 MG/1
5 PATCH TOPICAL
Qty: 1 | Refills: 1 | Status: COMPLETED | COMMUNITY
Start: 2019-12-10 | End: 2022-03-21

## 2022-03-21 RX ORDER — CYCLOBENZAPRINE HYDROCHLORIDE 10 MG/1
10 TABLET, FILM COATED ORAL
Qty: 15 | Refills: 0 | Status: COMPLETED | COMMUNITY
Start: 2020-02-03 | End: 2022-03-21

## 2022-04-04 ENCOUNTER — APPOINTMENT (OUTPATIENT)
Dept: ORTHOPEDIC SURGERY | Facility: CLINIC | Age: 42
End: 2022-04-04

## 2022-04-11 ENCOUNTER — NON-APPOINTMENT (OUTPATIENT)
Age: 42
End: 2022-04-11

## 2022-05-11 ENCOUNTER — EMERGENCY (EMERGENCY)
Facility: HOSPITAL | Age: 42
LOS: 1 days | Discharge: HOME | End: 2022-05-11
Attending: STUDENT IN AN ORGANIZED HEALTH CARE EDUCATION/TRAINING PROGRAM | Admitting: STUDENT IN AN ORGANIZED HEALTH CARE EDUCATION/TRAINING PROGRAM
Payer: MEDICAID

## 2022-05-11 VITALS
SYSTOLIC BLOOD PRESSURE: 111 MMHG | HEIGHT: 67 IN | RESPIRATION RATE: 17 BRPM | WEIGHT: 134.26 LBS | DIASTOLIC BLOOD PRESSURE: 71 MMHG | HEART RATE: 92 BPM | TEMPERATURE: 98 F | OXYGEN SATURATION: 97 %

## 2022-05-11 PROCEDURE — 73030 X-RAY EXAM OF SHOULDER: CPT | Mod: 26,LT

## 2022-05-11 PROCEDURE — 99284 EMERGENCY DEPT VISIT MOD MDM: CPT

## 2022-05-11 PROCEDURE — 73030 X-RAY EXAM OF SHOULDER: CPT

## 2022-05-11 PROCEDURE — 99283 EMERGENCY DEPT VISIT LOW MDM: CPT

## 2022-05-11 RX ORDER — DEXTROAMPHETAMINE SACCHARATE, AMPHETAMINE ASPARTATE, DEXTROAMPHETAMINE SULFATE AND AMPHETAMINE SULFATE 1.875; 1.875; 1.875; 1.875 MG/1; MG/1; MG/1; MG/1
0 TABLET ORAL
Qty: 0 | Refills: 0 | DISCHARGE

## 2022-05-11 NOTE — ED PROVIDER NOTE - NSFOLLOWUPINSTRUCTIONS_ED_ALL_ED_FT
Follow up with the orthopedist. Continue PT as previously instructed  Tylenol as needed for pain  Any worsening of symptoms or new concerning symptoms return to the ED

## 2022-05-11 NOTE — ED PROVIDER NOTE - CLINICAL SUMMARY MEDICAL DECISION MAKING FREE TEXT BOX
pt 41y m presents to ED c/o L shoulder pain s/p injury on 3/11. Pt states he was moving heavy boxes at work when the shoulder "gave out". is currently in PT. states that his PT believes he has a rotator cuff and pt is planning on following up with an orthopedist but is requesting an xray and pain control. denies numbness/tingling, weakness, fever, chills  shoulder strain clinically. xray neg. will fu ortho  Discussed with patient need to return to ED if symptoms don't continue to improve or recur or develops any new or worsening symptoms that are of concern.

## 2022-05-11 NOTE — ED PROVIDER NOTE - OBJECTIVE STATEMENT
pt 41y m presents to ED c/o L shoulder pain s/p injury on 3/11. Pt states he was moving heavy boxes at work when the shoulder "gave out". is currently in PT. states that his PT believes he has a rotator cuff and pt is planning on following up with an orthopedist but is requesting an xray and pain control. denies numbness/tingling, weakness, fever, chills

## 2022-05-11 NOTE — ED ADULT NURSE NOTE - OBJECTIVE STATEMENT
pt presents to ED c/o L shoulder pain s/p injury on 3/11. Pt states he was moving heavy boxes at work when the shoulder "gave out". pt went to ED at that time for imaging and participated in physical therapy but reports no improvement in symptoms. states that his PT believes he has a rotator cuff and pt is planning on following up with an orthopedist. denies numbness/tingling.

## 2022-05-11 NOTE — ED PROVIDER NOTE - ATTENDING APP SHARED VISIT CONTRIBUTION OF CARE
I, Emilee Breaux DO,  performed the initial face to face bedside interview with this patient regarding history of present illness, review of symptoms and relevant past medical, social and family history.  I completed an independent physical examination.  I was the initial provider who evaluated this patient.   I personally saw the patient and performed a substantive portion of the visit including all aspects of the medical decision making.  I have signed out the follow up of any pending tests (i.e. labs, radiological studies) to the ACP.  I have communicated the patient’s plan of care and disposition with the ACP.

## 2022-05-11 NOTE — ED PROVIDER NOTE - CARE PROVIDER_API CALL
Doug Juarez)  Orthopaedic Sports Medicine; Orthopaedic Surgery  [address] [address]  Phone: [phone]  Fax: [phone]  Follow Up Time:

## 2022-05-11 NOTE — ED PROVIDER NOTE - PATIENT PORTAL LINK FT
You can access the FollowMyHealth Patient Portal offered by [facility] by registering at the following website: [email or web address]. By joining [employer]’s FollowMyHealth portal, you will also be able to view your health information using other applications (apps) compatible with our system.

## 2022-05-11 NOTE — ED PROVIDER NOTE - NS ED ATTENDING STATEMENT MOD
This was a shared visit with the JUSTIN. I reviewed and verified the documentation and independently performed the documented:

## 2022-05-19 ENCOUNTER — APPOINTMENT (OUTPATIENT)
Dept: ORTHOPEDIC SURGERY | Facility: CLINIC | Age: 42
End: 2022-05-19
Payer: OTHER MISCELLANEOUS

## 2022-05-19 VITALS — HEIGHT: 71 IN | WEIGHT: 155 LBS | BODY MASS INDEX: 21.7 KG/M2

## 2022-05-19 PROCEDURE — 99072 ADDL SUPL MATRL&STAF TM PHE: CPT

## 2022-05-19 PROCEDURE — 99204 OFFICE O/P NEW MOD 45 MIN: CPT

## 2022-05-21 NOTE — HISTORY OF PRESENT ILLNESS
[Pain Location] : pain [] : left shoulder [Worsening] : worsening [8] : a current pain level of 8/10 [10] : a maximum pain level of 10/10 [de-identified] : Patient is a RHD 42 yo male here under W/C who was at his job on 3/10/22 and lifted a heavy item and immediately felt like his left arm dropped and immediate pain. patient had seen his medical physician who had patient undergo an xray of the left shoulder.\par He also reports pain in the left side of his neck.\par He states the pain has been waking him up at night.\par He is currently unable to work, 100% temporarily disabled\par Authorization requested for an MRI of the left shoulder\par  [de-identified] : time of injury

## 2022-05-21 NOTE — HISTORY OF PRESENT ILLNESS
[Pain Location] : pain [] : left shoulder [Worsening] : worsening [8] : a current pain level of 8/10 [10] : a maximum pain level of 10/10 [de-identified] : Patient is a RHD 42 yo male here under W/C who was at his job on 3/10/22 and lifted a heavy item and immediately felt like his left arm dropped and immediate pain. patient had seen his medical physician who had patient undergo an xray of the left shoulder.\par He also reports pain in the left side of his neck.\par He states the pain has been waking him up at night.\par He is currently unable to work, 100% temporarily disabled\par Authorization requested for an MRI of the left shoulder\par  [de-identified] : time of injury

## 2022-05-21 NOTE — PHYSICAL EXAM
[Normal RUE] : Right Upper Extremity: No scars, rashes, lesions, ulcers, skin intact [Normal LUE] : Left Upper Extremity: No scars, rashes, lesions, ulcers, skin intact [Normal Touch] : sensation intact for touch [de-identified] : Cervical Spine/Neck  \par Inspection/Palpation :\par ¦ Inspection : alignment midline, decreased degree of lordosis present\par ¦ Skin : normal appearance, no masses or tenderness, trachea midline\par ¦ Palpation : musculature is nontender to palpation \par Range of Motion : arc of motion full in all planes, no crepitance, Left sided neck pain with rotation\par Stability : no subluxations or other evidence of instability demonstrated during range of motion testing \par Muscle Strength : paraspinal muscle strength within normal limits \par Muscle Tone : paraspinal muscle tone within normal limits \par Muscle Bulk : normal, no atrophy \par Cervical Lymph Nodes : no lymphadenopathy present\par \par \par Right Upper Extremity \par o Shoulder :\par ¦ Inspection/Palpation : no tenderness, swelling or deformities\par ¦ Range of Motion : ACTIVE FLEXION 170 degrees, ACTIVE ABDUCTION 165 degrees,ACTIVE EXTERNAL ROTATION 85 degrees, ACTIVE INTERNAL ROTATION measured at T8, no crepitance\par ¦ Strength : supraspinatus 5/5, external rotation 5/5, internal rotation 5/5\par ¦ Stability : no joint instability on provocative testing\par ¦ Tests/Signs : Neer (-), Swain (-)\par o Upper Arm : no tenderness, swelling or deformities\par o Muscle Bulk : no atrophy \par o Sensation : sensation intact to light touch \par o Skin : no skin rash or discoloration\par o Vascular Exam : no edema or cyanosis, radial and ulnar pulses normal \par \par Left Upper Extremity \par o Shoulder :\par ¦ Inspection/Palpation : moderate tenderness at the greater tuberosity, no swelling or deformities\par ¦ Range of Motion : ACTIVE FLEXION 115 degrees, ACTIVE ABDUCTION 45 degrees,ACTIVE EXTERNAL ROTATION 60 degrees, ACTIVE INTERNAL ROTATION measured at L5, no crepitance\par ¦ Strength : supraspinatus 3/5, external rotation 3/5, internal rotation 4+/5\par ¦ Stability : no joint instability on provocative testing\par ¦ Tests/Signs : Neer (+), Swain (+)\par o Upper Arm : no tenderness, swelling or deformities\par o Muscle Bulk : no atrophy\par o Sensation : sensation intact to light touch\par o Skin : no skin rash or discoloration\par o Vascular Exam : no edema or cyanosis, radial and ulnar pulses normal\par  \par \par \par  [de-identified] : No atrphy [de-identified] : INTERPRETATION: Left shoulder. 2 views. Patient has local pain.\par \par Shoulder is rather free of degeneration. No bone destruction or fracture.\par \par Findings are similar to September 2, 2011.\par \par IMPRESSION: Negative study.\par

## 2022-05-21 NOTE — DISCUSSION/SUMMARY
[de-identified] : The underlying pathophysiology was reviewed in great detail with the patient as well as the various treatment options, including ice, analgesics, NSAIDS, Physical therapy, steroid injections.\par \par He has had no improvement despite rest and antiinflammatory medications.\par \par An MRI will be ordered to r/o rotator cuff tear\par \par F/U after MRI

## 2022-05-21 NOTE — PHYSICAL EXAM
[Normal RUE] : Right Upper Extremity: No scars, rashes, lesions, ulcers, skin intact [Normal LUE] : Left Upper Extremity: No scars, rashes, lesions, ulcers, skin intact [Normal Touch] : sensation intact for touch [de-identified] : Cervical Spine/Neck  \par Inspection/Palpation :\par ¦ Inspection : alignment midline, decreased degree of lordosis present\par ¦ Skin : normal appearance, no masses or tenderness, trachea midline\par ¦ Palpation : musculature is nontender to palpation \par Range of Motion : arc of motion full in all planes, no crepitance, Left sided neck pain with rotation\par Stability : no subluxations or other evidence of instability demonstrated during range of motion testing \par Muscle Strength : paraspinal muscle strength within normal limits \par Muscle Tone : paraspinal muscle tone within normal limits \par Muscle Bulk : normal, no atrophy \par Cervical Lymph Nodes : no lymphadenopathy present\par \par \par Right Upper Extremity \par o Shoulder :\par ¦ Inspection/Palpation : no tenderness, swelling or deformities\par ¦ Range of Motion : ACTIVE FLEXION 170 degrees, ACTIVE ABDUCTION 165 degrees,ACTIVE EXTERNAL ROTATION 85 degrees, ACTIVE INTERNAL ROTATION measured at T8, no crepitance\par ¦ Strength : supraspinatus 5/5, external rotation 5/5, internal rotation 5/5\par ¦ Stability : no joint instability on provocative testing\par ¦ Tests/Signs : Neer (-), Swain (-)\par o Upper Arm : no tenderness, swelling or deformities\par o Muscle Bulk : no atrophy \par o Sensation : sensation intact to light touch \par o Skin : no skin rash or discoloration\par o Vascular Exam : no edema or cyanosis, radial and ulnar pulses normal \par \par Left Upper Extremity \par o Shoulder :\par ¦ Inspection/Palpation : moderate tenderness at the greater tuberosity, no swelling or deformities\par ¦ Range of Motion : ACTIVE FLEXION 115 degrees, ACTIVE ABDUCTION 45 degrees,ACTIVE EXTERNAL ROTATION 60 degrees, ACTIVE INTERNAL ROTATION measured at L5, no crepitance\par ¦ Strength : supraspinatus 3/5, external rotation 3/5, internal rotation 4+/5\par ¦ Stability : no joint instability on provocative testing\par ¦ Tests/Signs : Neer (+), Swain (+)\par o Upper Arm : no tenderness, swelling or deformities\par o Muscle Bulk : no atrophy\par o Sensation : sensation intact to light touch\par o Skin : no skin rash or discoloration\par o Vascular Exam : no edema or cyanosis, radial and ulnar pulses normal\par  \par \par \par  [de-identified] : No atrphy [de-identified] : INTERPRETATION: Left shoulder. 2 views. Patient has local pain.\par \par Shoulder is rather free of degeneration. No bone destruction or fracture.\par \par Findings are similar to September 2, 2011.\par \par IMPRESSION: Negative study.\par

## 2022-05-21 NOTE — REASON FOR VISIT
[Initial Consultation] : an initial consultation for [Shoulder Pain] : shoulder pain [FreeTextEntry2] : w/c left shoulder pain

## 2022-05-21 NOTE — DISCUSSION/SUMMARY
[de-identified] : The underlying pathophysiology was reviewed in great detail with the patient as well as the various treatment options, including ice, analgesics, NSAIDS, Physical therapy, steroid injections.\par \par He has had no improvement despite rest and antiinflammatory medications.\par \par An MRI will be ordered to r/o rotator cuff tear\par \par F/U after MRI

## 2022-06-09 ENCOUNTER — APPOINTMENT (OUTPATIENT)
Dept: MRI IMAGING | Facility: HOSPITAL | Age: 42
End: 2022-06-09

## 2022-06-09 PROBLEM — Z78.9 OTHER SPECIFIED HEALTH STATUS: Chronic | Status: ACTIVE | Noted: 2022-03-11

## 2022-06-15 ENCOUNTER — APPOINTMENT (OUTPATIENT)
Dept: MRI IMAGING | Facility: HOSPITAL | Age: 42
End: 2022-06-15
Payer: MEDICAID

## 2022-06-15 ENCOUNTER — OUTPATIENT (OUTPATIENT)
Dept: OUTPATIENT SERVICES | Facility: HOSPITAL | Age: 42
LOS: 1 days | End: 2022-06-15
Payer: MEDICAID

## 2022-06-15 DIAGNOSIS — M25.512 PAIN IN LEFT SHOULDER: ICD-10-CM

## 2022-06-15 DIAGNOSIS — S46.912A STRAIN OF UNSPECIFIED MUSCLE, FASCIA AND TENDON AT SHOULDER AND UPPER ARM LEVEL, LEFT ARM, INITIAL ENCOUNTER: ICD-10-CM

## 2022-06-15 PROCEDURE — 73221 MRI JOINT UPR EXTREM W/O DYE: CPT | Mod: 26,LT

## 2022-06-15 PROCEDURE — 73221 MRI JOINT UPR EXTREM W/O DYE: CPT

## 2022-07-05 ENCOUNTER — APPOINTMENT (OUTPATIENT)
Dept: ORTHOPEDIC SURGERY | Facility: CLINIC | Age: 42
End: 2022-07-05

## 2022-07-25 ENCOUNTER — APPOINTMENT (OUTPATIENT)
Dept: ORTHOPEDIC SURGERY | Facility: CLINIC | Age: 42
End: 2022-07-25

## 2022-08-04 ENCOUNTER — APPOINTMENT (OUTPATIENT)
Dept: ORTHOPEDIC SURGERY | Facility: CLINIC | Age: 42
End: 2022-08-04

## 2022-08-04 VITALS — WEIGHT: 160 LBS | BODY MASS INDEX: 24.25 KG/M2 | HEIGHT: 68 IN

## 2022-08-04 PROCEDURE — 99072 ADDL SUPL MATRL&STAF TM PHE: CPT

## 2022-08-04 PROCEDURE — 99214 OFFICE O/P EST MOD 30 MIN: CPT | Mod: 25

## 2022-08-04 PROCEDURE — 20610 DRAIN/INJ JOINT/BURSA W/O US: CPT | Mod: LT

## 2022-08-07 NOTE — ADDENDUM
[FreeTextEntry1] : I, Glenn lAlen, acted solely as a scribe for Dr. Doug Juarez on this date 08/04/2022.

## 2022-08-07 NOTE — PHYSICAL EXAM
[Normal RUE] : Right Upper Extremity: No scars, rashes, lesions, ulcers, skin intact [Normal LUE] : Left Upper Extremity: No scars, rashes, lesions, ulcers, skin intact [Normal Touch] : sensation intact for touch [Normal] : Alert and in no acute distress [de-identified] : \par Left Upper Extremity \par o Shoulder :\par ¦ Inspection/Palpation : moderate tenderness at the greater tuberosity, no swelling or deformities\par ¦ Range of Motion : ACTIVE FLEXION 90 degrees, ACTIVE ABDUCTION 45 degrees,ACTIVE EXTERNAL ROTATION 80 degrees, ACTIVE INTERNAL ROTATION measured at T10, no crepitance\par ¦ Strength : supraspinatus 4/5, external rotation 4/5, internal rotation 5-/5\par ¦ Stability : no joint instability on provocative testing\par ¦ Tests/Signs : Neer (+), Swain (+)\par o Upper Arm : no tenderness, swelling or deformities\par o Muscle Bulk : no atrophy\par o Sensation : sensation intact to light touch\par o Skin : no skin rash or discoloration\par o Vascular Exam : no edema or cyanosis, radial and ulnar pulses normal [de-identified] : EXAM:  MR SHOULDER LT\par PROCEDURE DATE:  06/15/2022\par INTERPRETATION:  LEFT SHOULDER MRI\par \par CLINICAL INFORMATION: Left shoulder pain. Evaluate for rotator cuff tear.\par TECHNIQUE: Multiplanar, multisequence MR imaging was obtained of the left shoulder.\par FINDINGS:\par \par ROTATOR CUFF: There is moderate supraspinatus and mild infraspinatus tendinosis. There is no rotator cuff tear.\par BICEPS TENDON: There is a small biceps tendon sheath effusion. There is no tear.\par AC JOINT: Mild AC joint arthrosis.\par GLENOID LABRUM AND GLENOHUMERAL LIGAMENTS: There is no labral tear.\par GLENOHUMERAL CARTILAGE AND SUBCHONDRAL BONE: The hyaline cartilage is intact. There is no subchondral edema.\par SYNOVIUM/JOINT FLUID: There is a physiologic amount of joint fluid. There is trace fluid in the subacromial subdeltoid bursa.\par BONE MARROW: There is no bone marrow edema or fracture.\par MUSCLES: Normal\par NEUROVASCULAR STRUCTURES: Normal\par SUBCUTANEOUS SOFT TISSUES: Normal\par \par IMPRESSION:\par \par Moderate supraspinatus and mild infraspinatus tendinosis.\par \par Mild AC joint arthrosis with trace fluid in the subacromial subdeltoid bursa.\par \par --- End of Report ---\par \par CLARA ANGELA MD; Attending Radiologist\par This document has been electronically signed. Jun 19 2022  1:28PM

## 2022-08-07 NOTE — HISTORY OF PRESENT ILLNESS
[Pain Location] : pain [] : left shoulder [Worsening] : worsening [8] : a current pain level of 8/10 [10] : a maximum pain level of 10/10 [de-identified] : Patient is a RHD 40 yo male here under W/C who was at his job on 3/10/22 and lifted a heavy item and immediately felt like his left arm dropped and immediate pain.\par Patient had seen his medical physician who had patient undergo an xray of the left shoulder.\par He also reports pain in the left side of his neck.\par He states the pain has been waking him up at night.\par He is currently unable to work, 100% temporarily disabled\par Patient presents today for MRI left shoulder results review. He continues to have pain in the left shoulder.  [de-identified] : time of injury

## 2022-08-07 NOTE — DISCUSSION/SUMMARY
[de-identified] : The underlying pathophysiology was reviewed in great detail with the patient as well as the various treatment options, including ice, analgesics, NSAIDS, Physical therapy, steroid injections.\par \par MRI of the left shoulder reviewed in great detail with the patient. Patient with moderate supraspinatus and mild infraspinatus tendinosis.\par \par A left shoulder cortisone injection was provided today in office for symptomatic relief. Patient tolerated procedure well. \par \par Activity modifications and restrictions were discussed. I advised avoiding overhead lifting. I advised the patient to work on good posture. \par \par FU in 6 weeks\par \par All questions were answered, all alternatives discussed and the patient is in complete agreement with that plan. Follow-up appointment as instructed. Any issues and the patient will call or come in sooner.

## 2022-08-07 NOTE — PROCEDURE
[de-identified] : At this point I recommended a therapeutic injection and under sterile precautions an injection of 4 cc 1% lidocaine with 0.5 cc of Kenalog and 0.5 cc of Dexamethasone- was placed into the subacromial space of the left shoulder without complication, and after several minutes, the patient felt significant relief.

## 2022-08-22 NOTE — ED ADULT TRIAGE NOTE - TEMPERATURE IN FAHRENHEIT (DEGREES F)
She has a fracture of her thumb.  Keep the splint on at all times.  The splint cannot get wet.  Follow-up with orthopedics this week to determine ultimate disposition. Tylenol and ibuprofen scheduled for at least the first 24 hours then as needed. Ice over the splint tonight. Return with severe pain, loss of sensation to thumb, thumb discoloration.   
97.3

## 2022-09-15 ENCOUNTER — APPOINTMENT (OUTPATIENT)
Dept: ORTHOPEDIC SURGERY | Facility: CLINIC | Age: 42
End: 2022-09-15

## 2022-10-27 ENCOUNTER — APPOINTMENT (OUTPATIENT)
Dept: ORTHOPEDIC SURGERY | Facility: CLINIC | Age: 42
End: 2022-10-27

## 2022-10-27 VITALS — HEIGHT: 69 IN | BODY MASS INDEX: 22.22 KG/M2 | WEIGHT: 150 LBS

## 2022-10-27 PROCEDURE — 20552 NJX 1/MLT TRIGGER POINT 1/2: CPT

## 2022-10-27 PROCEDURE — 99213 OFFICE O/P EST LOW 20 MIN: CPT | Mod: 25

## 2022-10-27 PROCEDURE — 99072 ADDL SUPL MATRL&STAF TM PHE: CPT

## 2022-12-08 ENCOUNTER — APPOINTMENT (OUTPATIENT)
Dept: ORTHOPEDIC SURGERY | Facility: CLINIC | Age: 42
End: 2022-12-08

## 2022-12-08 VITALS — WEIGHT: 155 LBS | HEIGHT: 70 IN | BODY MASS INDEX: 22.19 KG/M2

## 2022-12-08 DIAGNOSIS — M25.512 PAIN IN LEFT SHOULDER: ICD-10-CM

## 2022-12-08 PROCEDURE — 99072 ADDL SUPL MATRL&STAF TM PHE: CPT

## 2022-12-08 PROCEDURE — 99214 OFFICE O/P EST MOD 30 MIN: CPT

## 2023-01-01 NOTE — ED PROVIDER NOTE - EKG #1 DATE/TIME
For information on Fall & Injury Prevention, visit: [email or web address] OR  [email or web address] OR  https://www.cdc.gov/steadi/patient.html 02-Aug-2020 17:04

## 2023-01-19 ENCOUNTER — APPOINTMENT (OUTPATIENT)
Dept: ORTHOPEDIC SURGERY | Facility: CLINIC | Age: 43
End: 2023-01-19

## 2023-01-26 ENCOUNTER — APPOINTMENT (OUTPATIENT)
Dept: ORTHOPEDIC SURGERY | Facility: CLINIC | Age: 43
End: 2023-01-26

## 2023-03-20 ENCOUNTER — APPOINTMENT (OUTPATIENT)
Dept: ORTHOPEDIC SURGERY | Facility: CLINIC | Age: 43
End: 2023-03-20

## 2023-06-08 NOTE — COUNSELING
Detail Level: Detailed [Healthy eating counseling provided] : healthy eating [Activity counseling provided] : activity [Engage in a relaxing activity] : Engage in a relaxing activity Detail Level: Zone

## 2023-06-23 NOTE — ED ADULT TRIAGE NOTE - HEIGHT IN FEET
What Type Of Note Output Would You Prefer (Optional)?: Standard Output
How Severe Is Your Acne?: moderate
Is This A New Presentation, Or A Follow-Up?: Acne
Additional Comments (Use Complete Sentences): La Roche effaclar wash, s CeraVe moisturizer
5

## 2023-07-06 ENCOUNTER — APPOINTMENT (OUTPATIENT)
Dept: FAMILY MEDICINE | Facility: CLINIC | Age: 43
End: 2023-07-06
Payer: MEDICAID

## 2023-07-06 VITALS
DIASTOLIC BLOOD PRESSURE: 80 MMHG | RESPIRATION RATE: 14 BRPM | TEMPERATURE: 98.9 F | HEART RATE: 88 BPM | SYSTOLIC BLOOD PRESSURE: 122 MMHG | BODY MASS INDEX: 21.47 KG/M2 | WEIGHT: 150 LBS | OXYGEN SATURATION: 97 % | HEIGHT: 70 IN

## 2023-07-06 DIAGNOSIS — M54.50 LOW BACK PAIN, UNSPECIFIED: ICD-10-CM

## 2023-07-06 PROCEDURE — 99213 OFFICE O/P EST LOW 20 MIN: CPT

## 2023-07-06 NOTE — PHYSICAL EXAM
[No Acute Distress] : no acute distress [Well Nourished] : well nourished [Well Developed] : well developed [Normal Sclera/Conjunctiva] : normal sclera/conjunctiva [Normal Outer Ear/Nose] : the outer ears and nose were normal in appearance [No Respiratory Distress] : no respiratory distress  [Normal Rate] : normal rate  [Regular Rhythm] : with a regular rhythm [No Murmur] : no murmur heard [No Edema] : there was no peripheral edema [Soft] : abdomen soft [Non Tender] : non-tender [Non-distended] : non-distended [No HSM] : no HSM [Normal Supraclavicular Nodes] : no supraclavicular lymphadenopathy [Normal Posterior Cervical Nodes] : no posterior cervical lymphadenopathy [Normal Anterior Cervical Nodes] : no anterior cervical lymphadenopathy [No CVA Tenderness] : no CVA  tenderness [No Joint Swelling] : no joint swelling [Grossly Normal Strength/Tone] : grossly normal strength/tone [Coordination Grossly Intact] : coordination grossly intact [No Focal Deficits] : no focal deficits [Normal Gait] : normal gait [Deep Tendon Reflexes (DTR)] : deep tendon reflexes were 2+ and symmetric

## 2023-07-06 NOTE — HISTORY OF PRESENT ILLNESS
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

## 2023-07-12 ENCOUNTER — TRANSCRIPTION ENCOUNTER (OUTPATIENT)
Age: 43
End: 2023-07-12

## 2023-11-02 NOTE — ED ADULT NURSE NOTE - PAIN RATING/NUMBER SCALE (0-10): ACTIVITY
Gyne Visit    Visit provider:  Lyla SHANKAR, CNP  OB/Gyne Collaborating Physician: Js Mullen DO  Chaperone:  Laine Minor CMA      SUBJECTIVE:  Oriana Bruno is a 34 year old  female  here for  Vaginal pain.     HX of RA-TLH, excision of endometriosis and cystoscopy for chronic pelvic pain and AUB on 23 with Dr. Mullen for menorrhagia and pelvic pain.    Also diagnosed with Crohn's Dx in  with 3rd pregnancy.      Today has concerns with a throbbing pain in the vagina.  No itching.  Initially thought she had UTI and increased voiding.  She is unsure if BV again.  No itching, no odor, but notes a brown DC after sex 5 days ago.   Denies dyspareunia.   No further brown DC noted.    Also reports umbilical pain and cramping recently , similar to when she has a period.     She has also noted some day time hot flashes, and has been getting night sweats since before hysterectomy.    Dr. Awan assessed her thyroid 2023, wnl  Currently taking vitamin D for deficiency.   Low FE also, taking FE also.      Ovarian thrombophlebitis in 2023.   Has Follow up CT scheduled.   On Xarelto daily.        Past Medical History:   Diagnosis Date   • Allergic rhinitis due to allergen    • Anxiety 2023   • COVID-19 2020   • Crohn's disease of colon without complication (CMD) 2021   • Endometriosis determined by laparoscopy 2020   • Fibroid uterus    • Gallbladder stone without cholecystitis or obstruction 2019   • Iron deficiency anemia 2023   • Irregular menstrual cycle 2023   • Left ovarian cyst     endometrioma   • Menorrhagia        Past Surgical History:   Procedure Laterality Date   • ------------other------------- Left     ankle fracture   • Adenoidectomy     •  delivery only  2016   • Cholecystectomy  2019   • Endometrial biopsy  10/11/2022   • Myomectomy  2018    Myosure, drainage of endometrioma   • Salpingectomy  2021    Right  fallopian tube   • Tonsillectomy     • Total abdom hysterectomy  04/20/2023   • [place] tooth extraction  02/2018          OBJECTIVE:     /80   Pulse 83   Temp 98.2 °F (36.8 °C) (Temporal)   Ht 5' 1\" (1.549 m)   Wt 102.5 kg (226 lb)   LMP 04/09/2023 (Exact Date)   BMI 42.70 kg/m²   BSA 1.99 m²   General exam: Patient appears well, vital signs normal, not in acute distress.   A&O x3.  Normal affect. Well groomed.   No edema noted in lower extremities.   Gait steady  Respirations even and unlabored.  Skin: no obvious visible rashes or suspicious skin lesions noted.    EGBUS: No lesions noted. WNL.   Vagina:   Reddened introitus  Reddened vagina with light , particulate old brown bloody  dc noted.     Bimanual: Uterus has been surgically removed.    Vag cuff appears intact visually and manually.    Bladder appears wnl.      Genprobe declined.     I have personally reviewed and analyzed history within patient chart.      ASSESSMENT:  vaginal pain, probable yeast infection  Post RA-TLH   hx thrombophlebitis., on xarelto.    hx endometriosis    PLAN:  ·  Prescription diflucan ordered.   · Reviewed with patient that she may have umbilical endometriosis and possible minute spots of vaginal endometriosis that could be causing the dark bloody DC.   Advised to Follow up with Dr. Mullen for evaluation .  States understanding.  · Advised to monitor her symptoms and dark spotting to see if it is cyclical and to take ibuprofen prn as if she ws having a small period.   States understanding.     Rtc prn    Total time I spent today on this appointment is 30+ minutes including chart review, visit, any consultation necessary, and documentation.     Lyla Villar, CNP   6

## 2023-12-15 ENCOUNTER — NON-APPOINTMENT (OUTPATIENT)
Age: 43
End: 2023-12-15

## 2023-12-15 ENCOUNTER — APPOINTMENT (OUTPATIENT)
Dept: FAMILY MEDICINE | Facility: CLINIC | Age: 43
End: 2023-12-15
Payer: MEDICAID

## 2023-12-15 VITALS
BODY MASS INDEX: 19.33 KG/M2 | HEIGHT: 70 IN | RESPIRATION RATE: 12 BRPM | DIASTOLIC BLOOD PRESSURE: 64 MMHG | OXYGEN SATURATION: 99 % | SYSTOLIC BLOOD PRESSURE: 102 MMHG | WEIGHT: 135 LBS | HEART RATE: 87 BPM | TEMPERATURE: 97.7 F

## 2023-12-15 DIAGNOSIS — Z00.00 ENCOUNTER FOR GENERAL ADULT MEDICAL EXAMINATION W/OUT ABNORMAL FINDINGS: ICD-10-CM

## 2023-12-15 PROCEDURE — 36415 COLL VENOUS BLD VENIPUNCTURE: CPT

## 2023-12-15 PROCEDURE — 93000 ELECTROCARDIOGRAM COMPLETE: CPT

## 2023-12-15 PROCEDURE — 99396 PREV VISIT EST AGE 40-64: CPT | Mod: 25

## 2023-12-15 NOTE — PHYSICAL EXAM
[No Acute Distress] : no acute distress [Normal Sclera/Conjunctiva] : normal sclera/conjunctiva [Normal Outer Ear/Nose] : the outer ears and nose were normal in appearance [Normal Oropharynx] : the oropharynx was normal [No JVD] : no jugular venous distention [No Lymphadenopathy] : no lymphadenopathy [Supple] : supple [Thyroid Normal, No Nodules] : the thyroid was normal and there were no nodules present [No Respiratory Distress] : no respiratory distress  [No Accessory Muscle Use] : no accessory muscle use [Clear to Auscultation] : lungs were clear to auscultation bilaterally [Normal Rate] : normal rate  [Regular Rhythm] : with a regular rhythm [No Edema] : there was no peripheral edema [Soft] : abdomen soft [Non Tender] : non-tender [No Masses] : no abdominal mass palpated [No HSM] : no HSM [Normal Supraclavicular Nodes] : no supraclavicular lymphadenopathy [No Joint Swelling] : no joint swelling [No Rash] : no rash [No Skin Lesions] : no skin lesions

## 2023-12-15 NOTE — HISTORY OF PRESENT ILLNESS
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

## 2023-12-15 NOTE — HEALTH RISK ASSESSMENT
[Very Good] : ~his/her~  mood as very good [Yes] : Yes [4 or more  times a week (4 pts)] : 4 or more  times a week (4 points) [1 or 2 (0 pts)] : 1 or 2 (0 points) [de-identified] : none [de-identified] : none [Current] : Current [de-identified] : 3 cigarettes daily

## 2023-12-15 NOTE — ASSESSMENT
[FreeTextEntry1] : EKG shows short IA interval patient has been on Adderall in the past without incident we will obtain routine laboratory work including thyroid function tests and follow-up with him regarding medication after test results are available to us

## 2023-12-21 ENCOUNTER — APPOINTMENT (OUTPATIENT)
Dept: FAMILY MEDICINE | Facility: CLINIC | Age: 43
End: 2023-12-21
Payer: MEDICAID

## 2023-12-21 VITALS
HEART RATE: 97 BPM | OXYGEN SATURATION: 98 % | SYSTOLIC BLOOD PRESSURE: 110 MMHG | TEMPERATURE: 98 F | WEIGHT: 135 LBS | RESPIRATION RATE: 12 BRPM | DIASTOLIC BLOOD PRESSURE: 74 MMHG | BODY MASS INDEX: 19.33 KG/M2 | HEIGHT: 70 IN

## 2023-12-21 PROCEDURE — 99213 OFFICE O/P EST LOW 20 MIN: CPT

## 2023-12-21 NOTE — REVIEW OF SYSTEMS
[Fever] : fever [Chills] : no chills [Earache] : earache [Postnasal Drip] : postnasal drip [Sore Throat] : sore throat [Lower Ext Edema] : no lower extremity edema [Shortness Of Breath] : shortness of breath [Cough] : cough [FreeTextEntry4] : b/l earache

## 2023-12-21 NOTE — HISTORY OF PRESENT ILLNESS
[FreeTextEntry8] : Pt comes in for sick visit. Pt's son got sick at a [facility] parade, he had a fever a few days ago. His son is 1 year old, his grandmother tested positive for COVID. Woke up this morning sweating, runny nose, ears hurt, sore throat. Feels feverish, home thermometer showed fever. Did not do home COVID testing. He took tylenol earlier today. Clearing throat, is coughing up thick white phlegm. Mild SOB today.

## 2023-12-21 NOTE — PHYSICAL EXAM
[Normal Sclera/Conjunctiva] : normal sclera/conjunctiva [Normal Outer Ear/Nose] : the outer ears and nose were normal in appearance [Normal Oropharynx] : the oropharynx was normal [No Lymphadenopathy] : no lymphadenopathy [Normal] : no respiratory distress, lungs were clear to auscultation bilaterally and no accessory muscle use [Normal Gait] : normal gait [Normal Affect] : the affect was normal [Alert and Oriented x3] : oriented to person, place, and time [Normal Insight/Judgement] : insight and judgment were intact

## 2023-12-21 NOTE — ASSESSMENT
[FreeTextEntry1] : # COVID exposure, viral syndrome f/u COVID/RSV/flu testing Allergic to NSAIDs Tylenol PRN fever/bodyaches If patient develops worsening SOB, chest pain/pressure he should go to ED  f/u pending lab results

## 2023-12-22 LAB
INFLUENZA A RESULT: NOT DETECTED
INFLUENZA B RESULT: NOT DETECTED
RESP SYN VIRUS RESULT: NOT DETECTED
SARS-COV-2 RESULT: NOT DETECTED

## 2024-01-02 ENCOUNTER — APPOINTMENT (OUTPATIENT)
Dept: FAMILY MEDICINE | Facility: CLINIC | Age: 44
End: 2024-01-02

## 2024-01-14 LAB
ALBUMIN SERPL ELPH-MCNC: 4.5 G/DL
ALP BLD-CCNC: 59 U/L
ALT SERPL-CCNC: 14 U/L
ANION GAP SERPL CALC-SCNC: 12 MMOL/L
APPEARANCE: CLEAR
AST SERPL-CCNC: 20 U/L
BILIRUB SERPL-MCNC: 0.4 MG/DL
BILIRUBIN URINE: NEGATIVE
BLOOD URINE: NEGATIVE
BUN SERPL-MCNC: 12 MG/DL
CALCIUM SERPL-MCNC: 9.2 MG/DL
CHLORIDE SERPL-SCNC: 104 MMOL/L
CHOLEST SERPL-MCNC: 187 MG/DL
CO2 SERPL-SCNC: 25 MMOL/L
COLOR: NORMAL
CREAT SERPL-MCNC: 0.99 MG/DL
EGFR: 97 ML/MIN/1.73M2
ESTIMATED AVERAGE GLUCOSE: 100 MG/DL
GLUCOSE QUALITATIVE U: NEGATIVE MG/DL
GLUCOSE SERPL-MCNC: 81 MG/DL
HBA1C MFR BLD HPLC: 5.1 %
HCT VFR BLD CALC: 40.6 %
HDLC SERPL-MCNC: 79 MG/DL
HGB BLD-MCNC: 13.2 G/DL
KETONES URINE: ABNORMAL MG/DL
LDLC SERPL CALC-MCNC: 98 MG/DL
LEUKOCYTE ESTERASE URINE: NEGATIVE
MCHC RBC-ENTMCNC: 30.4 PG
MCHC RBC-ENTMCNC: 32.5 GM/DL
MCV RBC AUTO: 93.5 FL
NITRITE URINE: NEGATIVE
NONHDLC SERPL-MCNC: 108 MG/DL
PH URINE: 5.5
PLATELET # BLD AUTO: 250 K/UL
POTASSIUM SERPL-SCNC: 4.3 MMOL/L
PROT SERPL-MCNC: 7 G/DL
PROTEIN URINE: NEGATIVE MG/DL
RBC # BLD: 4.34 M/UL
RBC # FLD: 12.6 %
SODIUM SERPL-SCNC: 141 MMOL/L
SPECIFIC GRAVITY URINE: 1.02
TRIGL SERPL-MCNC: 54 MG/DL
TSH SERPL-ACNC: 0.67 UIU/ML
UROBILINOGEN URINE: 1 MG/DL
WBC # FLD AUTO: 4.58 K/UL

## 2024-02-05 ENCOUNTER — APPOINTMENT (OUTPATIENT)
Dept: FAMILY MEDICINE | Facility: CLINIC | Age: 44
End: 2024-02-05
Payer: MEDICAID

## 2024-02-05 VITALS
HEIGHT: 70 IN | WEIGHT: 132 LBS | OXYGEN SATURATION: 74 % | BODY MASS INDEX: 18.9 KG/M2 | HEART RATE: 98 BPM | RESPIRATION RATE: 12 BRPM | DIASTOLIC BLOOD PRESSURE: 70 MMHG | SYSTOLIC BLOOD PRESSURE: 109 MMHG | TEMPERATURE: 99.3 F

## 2024-02-05 PROCEDURE — 99213 OFFICE O/P EST LOW 20 MIN: CPT

## 2024-02-05 NOTE — HISTORY OF PRESENT ILLNESS
[FreeTextEntry1] : ADHD [de-identified] : Patient here a with regard to his ADHD and restart medication he had been on Adderall previously which was stopped several years ago he now has a new baby little over a year of age and a new job in a bakery where he is found that his ability to concentrate has not been as good as it needs to be comments were made about his distractibility by his employer he feels he would like to go back on medication recent visit laboratory etc. revealed no reason why he should not be able to he has a short [place] interval but has been on Adderall in the past without any issues

## 2024-02-05 NOTE — PHYSICAL EXAM
[No Acute Distress] : no acute distress [PERRL] : pupils equal round and reactive to light [Normal Oropharynx] : the oropharynx was normal [No JVD] : no jugular venous distention [No Respiratory Distress] : no respiratory distress  [Normal Rate] : normal rate  [No Murmur] : no murmur heard

## 2024-02-05 NOTE — PLAN
[FreeTextEntry1] : Will restart Adderall 10 mg XR patient to call in several weeks with a progress report

## 2024-03-12 ENCOUNTER — EMERGENCY (EMERGENCY)
Facility: HOSPITAL | Age: 44
LOS: 1 days | Discharge: HOME | End: 2024-03-12
Attending: EMERGENCY MEDICINE | Admitting: EMERGENCY MEDICINE
Payer: MEDICAID

## 2024-03-12 VITALS
TEMPERATURE: 97 F | HEART RATE: 107 BPM | WEIGHT: 134.92 LBS | RESPIRATION RATE: 21 BRPM | SYSTOLIC BLOOD PRESSURE: 147 MMHG | HEIGHT: 69 IN | DIASTOLIC BLOOD PRESSURE: 89 MMHG | OXYGEN SATURATION: 97 %

## 2024-03-12 VITALS
DIASTOLIC BLOOD PRESSURE: 88 MMHG | SYSTOLIC BLOOD PRESSURE: 132 MMHG | OXYGEN SATURATION: 98 % | TEMPERATURE: 97 F | HEART RATE: 99 BPM | RESPIRATION RATE: 20 BRPM

## 2024-03-12 PROCEDURE — 99283 EMERGENCY DEPT VISIT LOW MDM: CPT

## 2024-03-12 RX ORDER — LIDOCAINE/HYDROCORTISONE AC 3 %-0.5 %
1 CREAM WITH APPLICATOR RECTAL
Qty: 20 | Refills: 0
Start: 2024-03-12 | End: 2024-03-21

## 2024-03-12 RX ORDER — LIDOCAINE AND PRILOCAINE CREAM 25; 25 MG/G; MG/G
1 CREAM TOPICAL ONCE
Refills: 0 | Status: COMPLETED | OUTPATIENT
Start: 2024-03-12 | End: 2024-03-12

## 2024-03-12 RX ORDER — ACETAMINOPHEN 500 MG
975 TABLET ORAL ONCE
Refills: 0 | Status: COMPLETED | OUTPATIENT
Start: 2024-03-12 | End: 2024-03-12

## 2024-03-12 RX ADMIN — Medication 975 MILLIGRAM(S): at 07:08

## 2024-03-12 RX ADMIN — LIDOCAINE AND PRILOCAINE CREAM 1 APPLICATION(S): 25; 25 CREAM TOPICAL at 07:09

## 2024-03-12 NOTE — ED PROVIDER NOTE - OBJECTIVE STATEMENT
43-year-old male with no past medical history complaining of bleeding from his hemorrhoid earlier today after a bowel movement.  Associated with hemorrhoid pain.  Patient states he has noted a hemorrhoid and a lump at his anus for several weeks.  No abdominal pain.  No anal rectal trauma.  No fever or chills.  No chest pain shortness of breath dizziness.

## 2024-03-12 NOTE — ED PROVIDER NOTE - PATIENT PORTAL LINK FT
You can access the FollowMyHealth Patient Portal offered by [facility] by registering at the following website: [email or web address]. By joining [facility]’s FollowMyHealth portal, you will also be able to view your health information using other applications (apps) compatible with our system.

## 2024-03-12 NOTE — ED PROVIDER NOTE - NSFOLLOWUPINSTRUCTIONS_ED_ALL_ED_FT
-Take Tylenol as needed for pain  -Use lidocaine hydrocortisone rectal cream twice a day  -Drink plenty of fluids and increase fiber intake to avoid constipation  -Follow-up with rectal surgeon and/or gastroenterologist this week  -Return to any ER for worsening pain or severe bleeding, feeling faint, abdominal pain, or other concerns      Hemorrhoids    WHAT YOU NEED TO KNOW:    Hemorrhoids are swollen blood vessels inside your rectum (internal hemorrhoids) or on your anus (external hemorrhoids). Sometimes a hemorrhoid may prolapse. This means it extends out of your anus.    DISCHARGE INSTRUCTIONS:    Return to the emergency department if:    You have severe pain in your rectum or around your anus.    You have severe pain in your abdomen and you are vomiting.    You have bleeding from your anus that soaks through your underwear.  Contact your healthcare provider if:    You have frequent and painful bowel movements.    Your hemorrhoid looks or feels more swollen than usual.    You do not have a bowel movement for 2 days or more.    You see or feel tissue coming through your anus.    You have questions or concerns about your condition or care.  Medicines: You may need any of the following:    A pad, cream, or ointment can help decrease pain, swelling, and itching.    Stool softeners help treat or prevent constipation.    NSAIDs, such as ibuprofen, help decrease swelling, pain, and fever. NSAIDs can cause stomach bleeding or kidney problems in certain people. If you take blood thinner medicine, always ask your healthcare provider if NSAIDs are safe for you. Always read the medicine label and follow directions.    Take your medicine as directed. Contact your healthcare provider if you think your medicine is not helping or if you have side effects. Tell your provider if you are allergic to any medicine. Keep a list of the medicines, vitamins, and herbs you take. Include the amounts, and when and why you take them. Bring the list or the pill bottles to follow-up visits. Carry your medicine list with you in case of an emergency.  Manage your symptoms:    Apply ice on your anus for 15 to 20 minutes every hour or as directed. Use an ice pack, or put crushed ice in a plastic bag. Cover it with a towel before you apply it to your anus. Ice helps prevent tissue damage and decreases swelling and pain.    Take a sitz bath. Fill a bathtub with 4 to 6 inches of warm water. You may also use a sitz bath pan that fits inside a toilet bowl. Sit in the sitz bath for 15 minutes. Do this 3 times a day, and after each bowel movement. The warm water can help decrease pain and swelling.    Keep your anal area clean. Gently wash the area with warm water daily. Soap may irritate the area. After a bowel movement, wipe with moist towelettes or wet toilet paper. Dry toilet paper can irritate the area.  Prevent hemorrhoids:    Do not strain to have a bowel movement. Do not sit on the toilet too long. These actions can increase pressure on the tissues in your rectum and anus.    Drink plenty of liquids. Liquids can help prevent constipation. Ask how much liquid to drink each day and which liquids are best for you.    Eat a variety of high-fiber foods. Examples include fruits, vegetables, and whole grains. Ask your healthcare provider how much fiber you need each day. You may need to take a fiber supplement.        Exercise as directed. Exercise, such as walking, may make it easier to have a bowel movement. Ask your healthcare provider to help you create an exercise plan.    Do not have anal sex. Anal sex can weaken the skin around your rectum and anus.    Avoid heavy lifting. This can cause straining and increase your risk for another hemorrhoid.  Follow up with your doctor as directed: Write down your questions so you remember to ask them during your visits.

## 2024-03-12 NOTE — ED PROVIDER NOTE - PHYSICAL EXAMINATION
exam:   General: uncomfortable. NAD.   HEENT: eyes perrl,   cor: RRR, s1s2, 2+rad pulses.   lungs: ctabl, no resp distress.   abd: soft, ntnd. Left anal 1 x 2 cm fleshy lesion consistent with hemorrhoid.  Mild to moderate tenderness with mild bleeding.  No noted thrombosis or dark discoloration.  No discharge or pus  neuro: a&ox3, cn2-12 intact, DEE, 5/5 strength c nl sensation all extremities, nl coordination.   MSK: no extremity swelling.  Skin: normal, no rash

## 2024-03-12 NOTE — ED PROVIDER NOTE - CLINICAL SUMMARY MEDICAL DECISION MAKING FREE TEXT BOX
43-year-old male with no past medical history complaining of bleeding from his hemorrhoid earlier today after a bowel movement.  Associated with hemorrhoid pain.  Patient states he has noted a hemorrhoid and a lump at his anus for several weeks.  No abdominal pain.  No anal rectal trauma.  No fever or chills.  No chest pain shortness of breath dizziness.    Patient appears uncomfortable in pain.  Vitals unremarkable.  Patient with approximately 1 x 2 cm fleshy appearing left anal lesion, consistent with hemorrhoid.  Mild active bleeding.  Tender.  No clot or or dark discoloration noted not appear thrombosed.  Will give topical lidocaine.  Lidocaine hydrocortisone topical prescribed.  Considered checking labs, hemoglobin, but not indicated at this time as no signs or symptoms of significant blood loss/anemia.  Recommend follow-up with colorectal surgery or GI.  Return for worsening

## 2024-03-12 NOTE — ED PROVIDER NOTE - CARE PROVIDER_API CALL
Silke Gautam.  Colon/Rectal Surgery  [address] [place] [address]  Phone: [phone]  Fax: [phone]  Follow Up Time: 1-3 Days

## 2024-04-30 NOTE — ED ADULT NURSE NOTE - NSFALLRSKOUTCOME_ED_ALL_ED
Universal Safety Interventions Render Risk Assessment In Note?: no Detail Level: Simple Additional Notes: Today we discussed  [place] in detail. She wants to consider this possibility for more time prior to initiating. We will discuss again at follow up.

## 2024-05-01 ENCOUNTER — APPOINTMENT (OUTPATIENT)
Dept: FAMILY MEDICINE | Facility: CLINIC | Age: 44
End: 2024-05-01
Payer: MEDICAID

## 2024-05-01 VITALS
DIASTOLIC BLOOD PRESSURE: 72 MMHG | TEMPERATURE: 97.8 F | OXYGEN SATURATION: 98 % | WEIGHT: 123 LBS | BODY MASS INDEX: 17.61 KG/M2 | HEIGHT: 70 IN | HEART RATE: 93 BPM | SYSTOLIC BLOOD PRESSURE: 120 MMHG | RESPIRATION RATE: 16 BRPM

## 2024-05-01 DIAGNOSIS — F17.210 NICOTINE DEPENDENCE, CIGARETTES, UNCOMPLICATED: ICD-10-CM

## 2024-05-01 DIAGNOSIS — Z86.19 PERSONAL HISTORY OF OTHER INFECTIOUS AND PARASITIC DISEASES: ICD-10-CM

## 2024-05-01 DIAGNOSIS — Z78.9 OTHER SPECIFIED HEALTH STATUS: ICD-10-CM

## 2024-05-01 DIAGNOSIS — S46.912A STRAIN OF UNSPECIFIED MUSCLE, FASCIA AND TENDON AT SHOULDER AND UPPER ARM LEVEL, LEFT ARM, INITIAL ENCOUNTER: ICD-10-CM

## 2024-05-01 DIAGNOSIS — R07.81 PLEURODYNIA: ICD-10-CM

## 2024-05-01 DIAGNOSIS — R10.12 LEFT UPPER QUADRANT PAIN: ICD-10-CM
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
## 2024-05-01 PROCEDURE — 99213 OFFICE O/P EST LOW 20 MIN: CPT

## 2024-05-01 NOTE — HISTORY OF PRESENT ILLNESS
[FreeTextEntry1] : needs med renewal [de-identified] : Pt comes in for med renewal. Needs refill on adderall - was on it in past for about 2 years, then stopped, now back on it for a few months. Doing well with current dose, sometimes has to take additional half dose in afternoon. Denies palpitations, chest pain/pressure, SOB, etc.

## 2024-05-01 NOTE — PHYSICAL EXAM
[Normal Sclera/Conjunctiva] : normal sclera/conjunctiva [Normal Outer Ear/Nose] : the outer ears and nose were normal in appearance [Normal] : normal rate, regular rhythm, normal S1 and S2 and no murmur heard [No Edema] : there was no peripheral edema [No Extremity Clubbing/Cyanosis] : no extremity clubbing/cyanosis [Normal Affect] : the affect was normal [Alert and Oriented x3] : oriented to person, place, and time [Normal Mood] : the mood was normal [Normal Insight/Judgement] : insight and judgment were intact

## 2024-05-01 NOTE — ASSESSMENT
[FreeTextEntry1] : # ADHD Cont current dose adderall, renewed script Checked ISTOP  # Light smoker 2 cigs/day  f/u in 3 months for med renewal

## 2024-05-05 ENCOUNTER — EMERGENCY (EMERGENCY)
Facility: HOSPITAL | Age: 44
LOS: 1 days | Discharge: HOME | End: 2024-05-05
Attending: EMERGENCY MEDICINE | Admitting: EMERGENCY MEDICINE
Payer: MEDICAID

## 2024-05-05 VITALS
SYSTOLIC BLOOD PRESSURE: 114 MMHG | HEIGHT: 69 IN | HEART RATE: 60 BPM | OXYGEN SATURATION: 98 % | RESPIRATION RATE: 18 BRPM | TEMPERATURE: 98 F | WEIGHT: 130.07 LBS | DIASTOLIC BLOOD PRESSURE: 80 MMHG

## 2024-05-05 VITALS
HEART RATE: 62 BPM | RESPIRATION RATE: 18 BRPM | DIASTOLIC BLOOD PRESSURE: 89 MMHG | SYSTOLIC BLOOD PRESSURE: 115 MMHG | OXYGEN SATURATION: 98 %

## 2024-05-05 PROCEDURE — 93005 ELECTROCARDIOGRAM TRACING: CPT

## 2024-05-05 PROCEDURE — 93010 ELECTROCARDIOGRAM REPORT: CPT

## 2024-05-05 PROCEDURE — 99284 EMERGENCY DEPT VISIT MOD MDM: CPT

## 2024-05-05 PROCEDURE — 99283 EMERGENCY DEPT VISIT LOW MDM: CPT | Mod: 25

## 2024-05-05 PROCEDURE — 96372 THER/PROPH/DIAG INJ SC/IM: CPT

## 2024-05-05 RX ORDER — DEXAMETHASONE 0.5 MG/5ML
6 ELIXIR ORAL ONCE
Refills: 0 | Status: COMPLETED | OUTPATIENT
Start: 2024-05-05 | End: 2024-05-05

## 2024-05-05 RX ORDER — TRAMADOL HYDROCHLORIDE 50 MG/1
50 TABLET ORAL ONCE
Refills: 0 | Status: DISCONTINUED | OUTPATIENT
Start: 2024-05-05 | End: 2024-05-05

## 2024-05-05 RX ADMIN — TRAMADOL HYDROCHLORIDE 50 MILLIGRAM(S): 50 TABLET ORAL at 03:30

## 2024-05-05 RX ADMIN — Medication 6 MILLIGRAM(S): at 02:40

## 2024-05-05 RX ADMIN — TRAMADOL HYDROCHLORIDE 50 MILLIGRAM(S): 50 TABLET ORAL at 02:39

## 2024-05-05 NOTE — ED ADULT NURSE NOTE - OBJECTIVE STATEMENT
pt axo3, c/o chest pain radiating from L shoulder and back starting at 9pm. states that he thinks he tore his L rotator cuff x2 years ago. safety maintained.

## 2024-05-05 NOTE — ED PROVIDER NOTE - NSFOLLOWUPINSTRUCTIONS_ED_ALL_ED_FT
Shoulder Pain    Many things can cause shoulder pain, including:  An injury.  Moving the shoulder in the same way again and again (overuse).  Joint pain (arthritis).  Pain can come from:  Swelling and irritation (inflammation) of any part of the shoulder.  An injury to:  The shoulder joint.  Tissues that connect muscle to bone (tendons).  Tissues that connect bones to each other (ligaments).  Bones.  Follow these instructions at home:  Watch for changes in your symptoms. Let your doctor know about them. Follow these instructions to help with your pain.    If you have a sling that can be taken off:    Wear the sling as told by your doctor. Take it off only as told by your doctor.  Check the skin around the sling every day. Tell your doctor if you see problems.  Loosen the sling if your fingers:  Tingle.  Become numb.  Become cold.  Keep the sling clean.  If the sling is not waterproof:  Do not let it get wet.  Take the sling off when you shower or bathe.  Managing pain, stiffness, and swelling    Bag of ice on a towel on the skin.  If told, put ice on the painful area.  Put ice in a plastic bag.  Place a towel between your skin and the bag.  Leave the ice on for 20 minutes, 2–3 times a day. Stop putting ice on if it does not help with the pain.  If your skin turns bright red, take off the ice right away to prevent skin damage. The risk of damage is higher if you cannot feel pain, heat, or cold.  Squeeze a soft ball or a foam pad as much as possible. This prevents swelling in the shoulder. It also helps to strengthen the arm.  General instructions    Take over-the-counter and prescription medicines only as told by your doctor.  Keep all follow-up visits. This will help you avoid any type of permanent shoulder problems.  Contact a doctor if:  Your pain gets worse.  Medicine does not help your pain.  You have new pain in your arm, hand, or fingers.  You loosen your sling and your arm, hand, or fingers:  Tingle.  Are numb.  Are swollen.  Get help right away if:  Your arm, hand, or fingers turn white or blue.  This information is not intended to replace advice given to you by your health care provider. Make sure you discuss any questions you have with your health care provider.

## 2024-05-05 NOTE — ED PROVIDER NOTE - CLINICAL SUMMARY MEDICAL DECISION MAKING FREE TEXT BOX
43-year-old male presents to the emergency department reporting left shoulder pain.  Patient states that he had to turn something heavy at work today.  Patient states he had "a second of chest pain" and a "second of shortness of breath" however that is now resolved.  Shoulder pain persists however feels better when he rests.  Worse with movement.  No numbness or tingling at this time.  Patient states he has known left rotator cuff issues.  He was seeing an orthopedist however his insurance lapsed about 6 months ago.  No trauma reported.  No fever or chills.  No redness or rash. Exam as stated.  No acute concerns other than patient in pain.  Will give medications as needed for pain.  ekg NSR. Patient states he is allergic to naproxen.  Will give tramadol and IM Decadron.  Stable for discharge.  Patient states he is talking with a  to discuss his Worker's Comp. issues related to the insurance and his follow-up.  Stable for discharge.  Will offer a To assist patient in getting home.

## 2024-05-05 NOTE — ED PROVIDER NOTE - PHYSICAL EXAMINATION
General:     NAD, unkempt  Lungs:     CTA b/l  CVS:     RRR  Ext:   no deformities, left shoulder with tenderness with ROM. Distal neurovasc intact.    Skin: no rash  Neuro: AAOx3, no sensory/motor deficits

## 2024-05-05 NOTE — ED ADULT NURSE NOTE - CAS DISCH TRANSFER METHOD
Please keep cardiology appt next Friday as scheduled.  Can take metoprolol 12.5mg twice daily to mitigate symptoms of dizziness.  Return immediately to the ED with any new or worsening symptoms as discussed.  
Private car

## 2024-05-05 NOTE — ED ADULT NURSE NOTE - ISOLATION TYPE:
None Detail Level: Generalized General Sunscreen Counseling: I recommended a broad spectrum sunscreen with a SPF of 30 or higher.  I explained that SPF 30 sunscreens block approximately 97 percent of the sun's harmful rays.  Sunscreens should be applied at least 15 minutes prior to expected sun exposure and then every 2 hours after that as long as sun exposure continues. If swimming or exercising sunscreen should be reapplied every 45 minutes to an hour after getting wet or sweating.  One ounce, or the equivalent of a shot glass full of sunscreen, is adequate to protect the skin not covered by a bathing suit. I also recommended a lip balm with a sunscreen as well. Sun protective clothing can be used in lieu of sunscreen but must be worn the entire time you are exposed to the sun's rays.

## 2024-05-05 NOTE — ED ADULT TRIAGE NOTE - CHIEF COMPLAINT QUOTE
pt axo3, c/o chest pain radiating from L shoulder and back starting at 9pm. states that he thinks he tore his L rotator cuff x2 years ago.

## 2024-05-05 NOTE — ED ADULT TRIAGE NOTE - BMI (KG/M2)
Ventricular Rate : 82  Atrial Rate : 82  P-R Interval : 178  QRS Duration : 108  Q-T Interval : 406  QTC Calculation(Bazett) : 474  P Axis : 47  R Axis : 10  T Axis : 43  Diagnosis : Normal sinus rhythm  Minimal voltage criteria for LVH, may be normal variant  Borderline ECG  When compared with ECG of 21-NOV-2016 14:42,  No significant change was found  Confirmed by Scot Black ([id number]) on 1/24/2020 4:32:06 PM  
19.2

## 2024-05-05 NOTE — ED PROVIDER NOTE - OBJECTIVE STATEMENT
43-year-old male presents to the emergency department reporting left shoulder pain.  Patient states that he had to turn something heavy at work today.  Patient states he had "a second of chest pain" and a "second of shortness of breath" however that is now resolved.  Shoulder pain persists however feels better when he rests.  Worse with movement.  No numbness or tingling at this time.  Patient states he has known left rotator cuff issues.  He was seeing an orthopedist however his insurance lapsed about 6 months ago.  No trauma reported.  No fever or chills.  No redness or rash.

## 2024-05-05 NOTE — ED ADULT NURSE NOTE - NSFALLUNIVINTERV_ED_ALL_ED
Bed/Stretcher in lowest position, wheels locked, appropriate side rails in place/Call bell, personal items and telephone in reach/Instruct patient to call for assistance before getting out of bed/chair/stretcher/Non-slip footwear applied when patient is off stretcher/[place] to call system/Physically safe environment - no spills, clutter or unnecessary equipment/Purposeful proactive rounding/Room/bathroom lighting operational, light cord in reach

## 2024-05-08 ENCOUNTER — APPOINTMENT (OUTPATIENT)
Dept: FAMILY MEDICINE | Facility: CLINIC | Age: 44
End: 2024-05-08
Payer: MEDICAID

## 2024-05-08 VITALS
DIASTOLIC BLOOD PRESSURE: 80 MMHG | HEART RATE: 85 BPM | RESPIRATION RATE: 18 BRPM | HEIGHT: 70 IN | SYSTOLIC BLOOD PRESSURE: 110 MMHG | WEIGHT: 123 LBS | OXYGEN SATURATION: 98 % | BODY MASS INDEX: 17.61 KG/M2 | TEMPERATURE: 98.1 F

## 2024-05-08 DIAGNOSIS — F90.9 ATTENTION-DEFICIT HYPERACTIVITY DISORDER, UNSPECIFIED TYPE: ICD-10-CM

## 2024-05-08 DIAGNOSIS — Z84.1 FAMILY HISTORY OF DISORDERS OF KIDNEY AND URETER: ICD-10-CM

## 2024-05-08 DIAGNOSIS — R07.9 CHEST PAIN, UNSPECIFIED: ICD-10-CM

## 2024-05-08 DIAGNOSIS — Z82.49 FAMILY HISTORY OF ISCHEMIC HEART DISEASE AND OTHER DISEASES OF THE CIRCULATORY SYSTEM: ICD-10-CM

## 2024-05-08 DIAGNOSIS — F17.210 NICOTINE DEPENDENCE, CIGARETTES, UNCOMPLICATED: ICD-10-CM

## 2024-05-08 PROCEDURE — 99406 BEHAV CHNG SMOKING 3-10 MIN: CPT

## 2024-05-08 PROCEDURE — 99214 OFFICE O/P EST MOD 30 MIN: CPT

## 2024-05-08 RX ORDER — PHENYLEPHRINE HCL 10 MG
7 TABLET ORAL
Qty: 3 | Refills: 0 | Status: ACTIVE | COMMUNITY
Start: 2024-05-08 | End: 1900-01-01

## 2024-05-08 NOTE — REVIEW OF SYSTEMS
[Chest Pain] : chest pain [Palpitations] : no palpitations [Lower Ext Edema] : no lower extremity edema [Orthopena] : no orthopnea [Negative] : Respiratory [FreeTextEntry9] : L shoulder pain, hx of rotator cuff tendinitis

## 2024-05-08 NOTE — HISTORY OF PRESENT ILLNESS
[FreeTextEntry1] : f/u ED visit [de-identified] : Pt went to  ED early morning 5/5/24 with L shoulder pain and a second of shortness of breath. Cardiac work-up was negative (EKG showed NSR), he has known L rotator cuff issues. Given tramadol and IM decadron in ED, pain improved. Pt has known Agent Orange exposure bc lived at [employer] when he was an infant. His dad ended up in kidney failure and had heart issues around same age patient is now.  Pt is choosing to not take adderall bc doesn't want to cause heart-related issues.

## 2024-05-08 NOTE — ASSESSMENT
[FreeTextEntry1] : # L chest pain, L shoulder pain/tendinitis EKG a few days ago normal Fam hx of CAD in father in his mid 50s Pt exposed to Agent Orange in childhood Referred to cardio Referred to ortho  # Smoker Pt is trying to quit Sent nicotine patches  # ADHD Pt may resume adderall for now He was tolerating well before  f/u PRN

## 2024-05-08 NOTE — PHYSICAL EXAM
[Normal Sclera/Conjunctiva] : normal sclera/conjunctiva [Normal Outer Ear/Nose] : the outer ears and nose were normal in appearance [Normal] : normal rate, regular rhythm, normal S1 and S2 and no murmur heard [No Edema] : there was no peripheral edema [No Extremity Clubbing/Cyanosis] : no extremity clubbing/cyanosis

## 2024-05-09 ENCOUNTER — APPOINTMENT (OUTPATIENT)
Dept: CARDIOLOGY | Facility: CLINIC | Age: 44
End: 2024-05-09

## 2024-05-10 ENCOUNTER — APPOINTMENT (OUTPATIENT)
Dept: GASTROENTEROLOGY | Facility: CLINIC | Age: 44
End: 2024-05-10
Payer: MEDICAID

## 2024-05-10 VITALS
WEIGHT: 128 LBS | HEART RATE: 79 BPM | SYSTOLIC BLOOD PRESSURE: 109 MMHG | HEIGHT: 70 IN | BODY MASS INDEX: 18.32 KG/M2 | OXYGEN SATURATION: 98 % | DIASTOLIC BLOOD PRESSURE: 70 MMHG

## 2024-05-10 DIAGNOSIS — Z12.11 ENCOUNTER FOR SCREENING FOR MALIGNANT NEOPLASM OF COLON: ICD-10-CM

## 2024-05-10 DIAGNOSIS — L29.0 PRURITUS ANI: ICD-10-CM

## 2024-05-10 DIAGNOSIS — Z77.098 CONTACT WITH AND (SUSPECTED) EXPOSURE TO OTHER HAZARDOUS, CHIEFLY NONMEDICINAL, CHEMICALS: ICD-10-CM

## 2024-05-10 DIAGNOSIS — K62.89 OTHER SPECIFIED DISEASES OF ANUS AND RECTUM: ICD-10-CM

## 2024-05-10 PROCEDURE — 99203 OFFICE O/P NEW LOW 30 MIN: CPT

## 2024-05-10 RX ORDER — HYDROCORTISONE 25 MG/G
2.5 CREAM TOPICAL 3 TIMES DAILY
Qty: 3 | Refills: 3 | Status: ACTIVE | COMMUNITY
Start: 2024-05-10 | End: 1900-01-01

## 2024-05-10 RX ORDER — SODIUM SULFATE, POTASSIUM SULFATE AND MAGNESIUM SULFATE 1.6; 3.13; 17.5 G/177ML; G/177ML; G/177ML
17.5-3.13-1.6 SOLUTION ORAL
Qty: 1 | Refills: 0 | Status: ACTIVE | COMMUNITY
Start: 2024-05-10 | End: 1900-01-01

## 2024-05-10 RX ORDER — HYDROCORTISONE ACETATE 25 MG/1
25 SUPPOSITORY RECTAL TWICE DAILY
Qty: 14 | Refills: 3 | Status: ACTIVE | COMMUNITY
Start: 2024-05-10 | End: 1900-01-01

## 2024-05-12 NOTE — HISTORY OF PRESENT ILLNESS
[FreeTextEntry1] : 43-year-old man with a Hx of exposure to Agent Orange while in the  (reports his father was also exposed to Agent Orange), current smoker and daily EtOH use, presents with anal pruritus and discomfort since ~ 1 month ago (attributes to a hemorrhoid). No BRBPR. Pt expresses concern about his cancer risk due to Agent Orange exposure.

## 2024-05-12 NOTE — CONSULT LETTER
[Dear  ___] : Dear  [unfilled], [Consult Letter:] : I had the pleasure of evaluating your patient, [unfilled]. [Please see my note below.] : Please see my note below. [Consult Closing:] : Thank you very much for allowing me to participate in the care of this patient.  If you have any questions, please do not hesitate to contact me. [Sincerely,] : Sincerely, [FreeTextEntry3] : Dr. Paolo Cota

## 2024-05-12 NOTE — PHYSICAL EXAM
[Hearing Threshold Finger Rub Not Kay] : hearing was normal [Normal Appearance] : the appearance of the neck was normal [Normal] : heart rate was normal and rhythm regular, normal S1 and S2, no murmurs [None] : no edema [Bowel Sounds] : normal bowel sounds [No Masses] : no abdominal mass palpated [Abdomen Soft] : soft [Oriented To Time, Place, And Person] : oriented to person, place, and time [de-identified] : rectal tenderness; rectal exam attempted

## 2024-05-12 NOTE — ASSESSMENT
[FreeTextEntry1] : 43-year-old man with a Hx of exposure to Agent Orange while in the  (reports his father was also exposed to Agent Orange), current smoker and daily EtOH use, presents with anal pruritus and discomfort since ~ 1 month ago (attributes to a hemorrhoid). Recommend that the pt schedule a colonoscopy. At today's visit, pt's anal area was too tender for a complete a rectal exam; will re-attempt under anesthesia. Also recommend that the pt use Anusol rectal suppositories and cream (up to 3xday) for anal pruritus and discomfort (ordered at today's visit).   All questions answered Call with any questions or concerns Time spent before and after visit reviewing patient's chart

## 2024-05-12 NOTE — ADDENDUM
[FreeTextEntry1] : Plan: Also recommend that the pt use Anusol rectal suppositories and cream (up to 3xday) for anal pruritus and discomfort (ordered at today's visit).   Colonoscopy Instructions: Clear liquid diet and bowel prep instructions discussed with the patient Patient instructed to have  take him/her home and to arrive 1 hour prior to the procedure Patient instructed to finish bowel prep 3 hours prior to the procedure Patient instructed to avoid NSAIDs for at least 5 days prior to the procedure Complications, risks, and alternatives discussed with the patient Patient advised to watch videos on YouTube regarding colonoscopy, and colonoscopy prep.   Note entered by Tamia Wilson, acting as a scribe for Dr. Paolo Cota.

## 2024-05-14 ENCOUNTER — APPOINTMENT (OUTPATIENT)
Dept: ORTHOPEDIC SURGERY | Facility: CLINIC | Age: 44
End: 2024-05-14

## 2024-05-14 DIAGNOSIS — M54.6 PAIN IN THORACIC SPINE: ICD-10-CM

## 2024-05-20 ENCOUNTER — APPOINTMENT (OUTPATIENT)
Dept: ORTHOPEDIC SURGERY | Facility: CLINIC | Age: 44
End: 2024-05-20
Payer: OTHER MISCELLANEOUS

## 2024-05-20 DIAGNOSIS — M75.82 OTHER SHOULDER LESIONS, LEFT SHOULDER: ICD-10-CM

## 2024-05-20 PROCEDURE — 73030 X-RAY EXAM OF SHOULDER: CPT | Mod: LT

## 2024-05-20 PROCEDURE — 99214 OFFICE O/P EST MOD 30 MIN: CPT | Mod: 25

## 2024-05-20 PROCEDURE — 20610 DRAIN/INJ JOINT/BURSA W/O US: CPT | Mod: LT

## 2024-06-04 ENCOUNTER — APPOINTMENT (OUTPATIENT)
Dept: GASTROENTEROLOGY | Facility: HOSPITAL | Age: 44
End: 2024-06-04

## 2024-06-17 ENCOUNTER — APPOINTMENT (OUTPATIENT)
Dept: CARDIOLOGY | Facility: CLINIC | Age: 44
End: 2024-06-17

## 2024-06-25 RX ORDER — DEXTROAMPHETAMINE SACCHARATE, AMPHETAMINE ASPARTATE MONOHYDRATE, DEXTROAMPHETAMINE SULFATE AND AMPHETAMINE SULFATE 2.5; 2.5; 2.5; 2.5 MG/1; MG/1; MG/1; MG/1
10 CAPSULE, EXTENDED RELEASE ORAL
Qty: 30 | Refills: 0 | Status: ACTIVE | COMMUNITY
Start: 2024-02-05 | End: 1900-01-01

## 2024-07-04 ENCOUNTER — EMERGENCY (EMERGENCY)
Facility: HOSPITAL | Age: 44
LOS: 1 days | Discharge: HOME | End: 2024-07-04
Attending: STUDENT IN AN ORGANIZED HEALTH CARE EDUCATION/TRAINING PROGRAM | Admitting: STUDENT IN AN ORGANIZED HEALTH CARE EDUCATION/TRAINING PROGRAM
Payer: MEDICAID

## 2024-07-04 VITALS
DIASTOLIC BLOOD PRESSURE: 73 MMHG | HEART RATE: 78 BPM | OXYGEN SATURATION: 99 % | RESPIRATION RATE: 19 BRPM | SYSTOLIC BLOOD PRESSURE: 110 MMHG | TEMPERATURE: 98 F | HEIGHT: 69 IN | WEIGHT: 149.91 LBS

## 2024-07-04 PROCEDURE — 73030 X-RAY EXAM OF SHOULDER: CPT | Mod: 26,LT

## 2024-07-04 PROCEDURE — 73030 X-RAY EXAM OF SHOULDER: CPT

## 2024-07-04 PROCEDURE — 99283 EMERGENCY DEPT VISIT LOW MDM: CPT

## 2024-07-04 PROCEDURE — 99284 EMERGENCY DEPT VISIT MOD MDM: CPT

## 2024-07-04 RX ORDER — OXYCODONE HYDROCHLORIDE 100 MG/5ML
1 SOLUTION ORAL
Qty: 9 | Refills: 0
Start: 2024-07-04 | End: 2024-07-06

## 2024-07-04 RX ORDER — OXYCODONE HYDROCHLORIDE 100 MG/5ML
5 SOLUTION ORAL ONCE
Refills: 0 | Status: DISCONTINUED | OUTPATIENT
Start: 2024-07-04 | End: 2024-07-04

## 2024-07-04 RX ADMIN — OXYCODONE HYDROCHLORIDE 5 MILLIGRAM(S): 100 SOLUTION ORAL at 15:08

## 2024-08-02 ENCOUNTER — APPOINTMENT (OUTPATIENT)
Dept: FAMILY MEDICINE | Facility: CLINIC | Age: 44
End: 2024-08-02
Payer: MEDICAID

## 2024-08-02 VITALS
TEMPERATURE: 99.6 F | BODY MASS INDEX: 20.04 KG/M2 | SYSTOLIC BLOOD PRESSURE: 109 MMHG | RESPIRATION RATE: 16 BRPM | WEIGHT: 140 LBS | DIASTOLIC BLOOD PRESSURE: 70 MMHG | HEIGHT: 70 IN | OXYGEN SATURATION: 98 % | HEART RATE: 83 BPM

## 2024-08-02 DIAGNOSIS — M25.512 PAIN IN LEFT SHOULDER: ICD-10-CM

## 2024-08-02 DIAGNOSIS — F17.210 NICOTINE DEPENDENCE, CIGARETTES, UNCOMPLICATED: ICD-10-CM

## 2024-08-02 DIAGNOSIS — F90.9 ATTENTION-DEFICIT HYPERACTIVITY DISORDER, UNSPECIFIED TYPE: ICD-10-CM

## 2024-08-02 DIAGNOSIS — M75.82 OTHER SHOULDER LESIONS, LEFT SHOULDER: ICD-10-CM

## 2024-08-02 DIAGNOSIS — Z13.1 ENCOUNTER FOR SCREENING FOR DIABETES MELLITUS: ICD-10-CM

## 2024-08-02 PROCEDURE — 99214 OFFICE O/P EST MOD 30 MIN: CPT

## 2024-08-02 NOTE — HISTORY OF PRESENT ILLNESS
[FreeTextEntry1] : f/u, med refills [de-identified] : Since last visit patient went to orthopedist, found to have a torn rotator cuff on L side and has started PT. Feeling much better now - had 3 cortisone shots.  Hemorrhoid: pt was supposed to get cscope done but GI was retiring. Planning on rescheduling with someone else.  Smoker: smoking 2-3 cig/day. Not using nicotine patches. Has been vaping more than smoking. Using nicotine cartridges.  ADHD: Taking adderall as prescribed, no side effects.

## 2024-08-02 NOTE — ASSESSMENT
[FreeTextEntry1] : # ADHD Checked iSTOP renewed adderall  # Smoker Still at 2-3 cigs/day Not willing to quit  # L shoulder pain Cont following w/ ortho  f/u in Dec 2024 for formal CPE, labs 1 week in advance

## 2024-08-02 NOTE — PHYSICAL EXAM
[Normal Sclera/Conjunctiva] : normal sclera/conjunctiva [Normal Outer Ear/Nose] : the outer ears and nose were normal in appearance [Normal] : normal rate, regular rhythm, normal S1 and S2 and no murmur heard [No Extremity Clubbing/Cyanosis] : no extremity clubbing/cyanosis [Normal Gait] : normal gait [Speech Grossly Normal] : speech grossly normal [Normal Affect] : the affect was normal [Alert and Oriented x3] : oriented to person, place, and time [Normal Mood] : the mood was normal [Normal Insight/Judgement] : insight and judgment were intact

## 2024-08-30 RX ORDER — DEXTROAMPHETAMINE SACCHARATE, AMPHETAMINE ASPARTATE, DEXTROAMPHETAMINE SULFATE AND AMPHETAMINE SULFATE 2.5; 2.5; 2.5; 2.5 MG/1; MG/1; MG/1; MG/1
10 TABLET ORAL DAILY
Qty: 30 | Refills: 0 | Status: ACTIVE | COMMUNITY
Start: 2024-08-30 | End: 1900-01-01

## 2024-12-16 ENCOUNTER — APPOINTMENT (OUTPATIENT)
Dept: FAMILY MEDICINE | Facility: CLINIC | Age: 44
End: 2024-12-16

## 2024-12-24 PROBLEM — F10.90 ALCOHOL USE: Status: ACTIVE | Noted: 2024-05-01

## 2024-12-27 ENCOUNTER — APPOINTMENT (OUTPATIENT)
Dept: FAMILY MEDICINE | Facility: CLINIC | Age: 44
End: 2024-12-27

## 2025-01-24 ENCOUNTER — APPOINTMENT (OUTPATIENT)
Dept: FAMILY MEDICINE | Facility: CLINIC | Age: 45
End: 2025-01-24
Payer: MEDICAID

## 2025-01-24 VITALS
TEMPERATURE: 99.5 F | OXYGEN SATURATION: 99 % | HEART RATE: 62 BPM | DIASTOLIC BLOOD PRESSURE: 72 MMHG | RESPIRATION RATE: 16 BRPM | HEIGHT: 70 IN | SYSTOLIC BLOOD PRESSURE: 106 MMHG | BODY MASS INDEX: 18.9 KG/M2 | WEIGHT: 132 LBS

## 2025-01-24 DIAGNOSIS — K62.89 OTHER SPECIFIED DISEASES OF ANUS AND RECTUM: ICD-10-CM

## 2025-01-24 DIAGNOSIS — L29.0 PRURITUS ANI: ICD-10-CM

## 2025-01-24 DIAGNOSIS — F90.9 ATTENTION-DEFICIT HYPERACTIVITY DISORDER, UNSPECIFIED TYPE: ICD-10-CM

## 2025-01-24 DIAGNOSIS — Z00.00 ENCOUNTER FOR GENERAL ADULT MEDICAL EXAMINATION W/OUT ABNORMAL FINDINGS: ICD-10-CM

## 2025-01-24 DIAGNOSIS — F17.210 NICOTINE DEPENDENCE, CIGARETTES, UNCOMPLICATED: ICD-10-CM

## 2025-01-24 PROCEDURE — 99396 PREV VISIT EST AGE 40-64: CPT

## 2025-01-24 PROCEDURE — 96127 BRIEF EMOTIONAL/BEHAV ASSMT: CPT

## 2025-04-30 ENCOUNTER — APPOINTMENT (OUTPATIENT)
Dept: FAMILY MEDICINE | Facility: CLINIC | Age: 45
End: 2025-04-30

## 2025-05-03 ENCOUNTER — EMERGENCY (EMERGENCY)
Facility: HOSPITAL | Age: 45
LOS: 1 days | End: 2025-05-03
Attending: EMERGENCY MEDICINE | Admitting: EMERGENCY MEDICINE
Payer: SELF-PAY

## 2025-05-03 VITALS
TEMPERATURE: 98 F | HEIGHT: 69 IN | SYSTOLIC BLOOD PRESSURE: 106 MMHG | DIASTOLIC BLOOD PRESSURE: 67 MMHG | HEART RATE: 78 BPM | WEIGHT: 158.07 LBS | RESPIRATION RATE: 15 BRPM | OXYGEN SATURATION: 97 %

## 2025-05-03 VITALS
HEART RATE: 78 BPM | OXYGEN SATURATION: 98 % | DIASTOLIC BLOOD PRESSURE: 73 MMHG | SYSTOLIC BLOOD PRESSURE: 110 MMHG | RESPIRATION RATE: 14 BRPM

## 2025-05-03 LAB — OB PNL STL: NEGATIVE — SIGNIFICANT CHANGE UP

## 2025-05-03 PROCEDURE — 99283 EMERGENCY DEPT VISIT LOW MDM: CPT

## 2025-05-03 PROCEDURE — 82272 OCCULT BLD FECES 1-3 TESTS: CPT

## 2025-05-03 RX ORDER — DOCUSATE SODIUM 100 MG
3 CAPSULE ORAL
Qty: 42 | Refills: 0
Start: 2025-05-03 | End: 2025-05-16

## 2025-05-03 NOTE — ED PROVIDER NOTE - CLINICAL SUMMARY MEDICAL DECISION MAKING FREE TEXT BOX
44-year-old male with history of hemorrhoid, with rectal bleed.  Rectal exam unremarkable, FOBT negative.  Results discussed with the patient patient has a GI doctor that he follows was advised to follow-up with gastroenterologist next week  and he voiced good understanding

## 2025-05-03 NOTE — ED ADULT NURSE NOTE - OBJECTIVE STATEMENT
c/o painful and irritated hemorrhoids, PMH of hemorrhoids in the past. pt reports has been straining with BM.

## 2025-05-03 NOTE — ED PROVIDER NOTE - OBJECTIVE STATEMENT
44-year-old male with rectal pain.  States that he has history of hemorrhoids and today he realized some blood on his stool, visited ED today.  Denies weight loss, night sweats, chest/back/neck/abdominal pain, headache, focal weakness or numbness, nausea vomiting diarrhea.  Denies any other symptoms.

## 2025-05-03 NOTE — ED PROVIDER NOTE - PATIENT PORTAL LINK FT
You can access the FollowMyHealth Patient Portal offered by [facility] by registering at the following website: [email or web address]. By joining [facility]’s FollowMyHealth portal, you will also be able to view your health information using other applications (apps) compatible with our system.

## 2025-05-07 ENCOUNTER — APPOINTMENT (OUTPATIENT)
Dept: FAMILY MEDICINE | Facility: CLINIC | Age: 45
End: 2025-05-07

## 2025-05-16 ENCOUNTER — APPOINTMENT (OUTPATIENT)
Dept: FAMILY MEDICINE | Facility: CLINIC | Age: 45
End: 2025-05-16

## 2025-05-28 ENCOUNTER — APPOINTMENT (OUTPATIENT)
Dept: FAMILY MEDICINE | Facility: CLINIC | Age: 45
End: 2025-05-28